# Patient Record
Sex: FEMALE | Race: WHITE | Employment: FULL TIME | URBAN - METROPOLITAN AREA
[De-identification: names, ages, dates, MRNs, and addresses within clinical notes are randomized per-mention and may not be internally consistent; named-entity substitution may affect disease eponyms.]

---

## 2017-10-01 PROBLEM — N93.9 VAGINAL BLEEDING: Status: ACTIVE | Noted: 2017-10-01

## 2017-10-02 PROBLEM — O44.00 PLACENTA PREVIA: Status: ACTIVE | Noted: 2017-10-02

## 2019-04-17 ENCOUNTER — OFFICE VISIT (OUTPATIENT)
Dept: FAMILY MEDICINE CLINIC | Age: 34
End: 2019-04-17

## 2019-04-17 VITALS
SYSTOLIC BLOOD PRESSURE: 108 MMHG | OXYGEN SATURATION: 98 % | BODY MASS INDEX: 27.64 KG/M2 | DIASTOLIC BLOOD PRESSURE: 70 MMHG | HEART RATE: 64 BPM | RESPIRATION RATE: 16 BRPM | HEIGHT: 66 IN | TEMPERATURE: 98.9 F | WEIGHT: 172 LBS

## 2019-04-17 DIAGNOSIS — Z00.00 ROUTINE PHYSICAL EXAMINATION: Primary | ICD-10-CM

## 2019-04-17 DIAGNOSIS — R00.2 PALPITATIONS: ICD-10-CM

## 2019-04-17 DIAGNOSIS — R05.9 COUGH: ICD-10-CM

## 2019-04-17 DIAGNOSIS — Z13.220 SCREENING FOR LIPID DISORDERS: ICD-10-CM

## 2019-04-17 DIAGNOSIS — Z86.32 HISTORY OF GESTATIONAL DIABETES: ICD-10-CM

## 2019-04-17 DIAGNOSIS — E55.9 VITAMIN D DEFICIENCY: ICD-10-CM

## 2019-04-17 DIAGNOSIS — I49.3 PVC'S (PREMATURE VENTRICULAR CONTRACTIONS): ICD-10-CM

## 2019-04-17 NOTE — PROGRESS NOTES
Patient: Odalys Silva MRN: [id number]  SSN: [id number]    YOB: 1985  Age: 26 y.o. Sex: female      Date of Service: 4/17/2019   Provider: MANN Mcmullen   Office Location:   [address] [place], [address] [address]  Office Phone: [phone]  Office Fax: [phone]        REASON FOR VISIT:   Chief Complaint   Patient presents with    New Patient    Physical     OB-GYN (Dr. Amilcar Browning)     Palpitations        VITALS:   Visit Vitals  /70 (BP 1 Location: Left arm, BP Patient Position: Sitting)   Pulse 64   Temp 98.9 °F (37.2 °C) (Oral)   Resp 16   Ht 5' 5.5\" (1.664 m)   Wt 172 lb (78 kg)   LMP 04/08/2019   SpO2 98%   BMI 28.19 kg/m²       MEDICATIONS:   Current Outpatient Medications   Medication Sig Dispense Refill    prenatal vit-calcium-iron-fa (PRENATAL PLUS, CALCIUM CARB,) 27 mg iron- 1 mg tab Take 1 Tab by mouth daily.  oxyCODONE-acetaminophen (PERCOCET) 5-325 mg per tablet Take 2 Tabs by mouth every six (6) hours as needed for Pain. Max Daily Amount: 8 Tabs. 30 Tab 0    ergocalciferol (VITAMIN D2) 50,000 unit capsule Take 50,000 Units by mouth every seven (7) days. Indications: VITAMIN D DEFICIENCY      ferrous sulfate 325 mg (65 mg iron) tablet Take 325 mg by mouth Daily (before breakfast). Indications: IRON DEFICIENCY ANEMIA      valACYclovir (VALTREX) 500 mg tablet Take  by mouth two (2) times a day. ALLERGIES:   No Known Allergies     MEDICAL/SURGICAL HISTORY:  Past Medical History:   Diagnosis Date    Genital herpes     Gestational diabetes     Vasovagal syncope 2016    worked up by cardiology Aby Parsons)      History reviewed. No pertinent surgical history.      FAMILY HISTORY:  Family History   Problem Relation Age of Onset    Other Mother         TIA    Gout Mother     Osteoporosis Mother     High Cholesterol Mother     Cancer Father         Skin Cancer ([place])    Gout Father     Migraines Brother     Heart Attack Maternal Grandmother     Pacemaker Maternal Grandmother     Osteoporosis Maternal Grandmother     Eczema Daughter         SOCIAL HISTORY:  Social History     Tobacco Use    Smoking status: Former Smoker     Last attempt to quit: 2/1/2016     Years since quitting: 3.2    Smokeless tobacco: Never Used   Substance Use Topics    Alcohol use: Yes     Comment: Rare            HISTORY OF PRESENT ILLNESS:   Tristin Rodrigues is a 26 y.o. female who presents to the office to establish care as a new patient, and for a routine physical exam. Patient reports she has been in good overall health, but does have a few concerns today. She currently works as an RN at [employer]. Palpitations -   Patient reports she has been experiencing heart palpitations intermittently for several years. She has also had several episodes of vasovagal syncope, which has been thoroughly worked up by cardiology in the past. She has no structural heart issues. Patient mentions that last year, she went to the ER at [facility] for worsening palpitations and associated SOB. She has a copy of a rhythm strip from her cardiac monitor at that time which shows a run of PVCs. She believes this to be consistent with the palpitations she has been feeling, which she describes as a periodic \"flutter\" in her chest and throat. Sometimes coughing will help stop the palpitations. They vary in frequency and severity, but triggers seem to include caffeine, stress, and sleep deprivation. History of Gestational DM -   Patient reports that she had gestational diabetes with her pregnancy in 2017. This did resolve after delivery and her 6 month post partum glucose tolerance test was normal, but she would like to follow up on her blood sugar today. She is actively working on weight loss through [employer]. Denies any family history of DM.      Cough -   Finally, patient mentions a lingering dry cough and post-nasal drainage following a VIKKII a few months ago. She has tried various OTC products sporadically with minimal relief. REVIEW OF SYSTEMS:  ROS (see scanned H&P form for complete 12 point ROS)     PHYSICAL EXAMINATION:  Physical Exam   Constitutional: She is oriented to person, place, and time and well-developed, well-nourished, and in no distress. HENT:   Head: Normocephalic and atraumatic. Right Ear: Tympanic membrane and external ear normal.   Left Ear: Tympanic membrane and external ear normal.   Nose: Nose normal.   Mouth/Throat: Uvula is midline, oropharynx is clear and moist and mucous membranes are normal.   Eyes: Pupils are equal, round, and reactive to light. Conjunctivae are normal. Right eye exhibits no discharge. Left eye exhibits no discharge. Neck: Neck supple. No thyromegaly present. Cardiovascular: Normal rate, regular rhythm and normal heart sounds. Exam reveals no gallop and no friction rub. No murmur heard. Pulmonary/Chest: Effort normal and breath sounds normal. No stridor. She has no wheezes. She has no rales. Abdominal: Soft. Bowel sounds are normal. She exhibits no distension and no mass. There is no tenderness. There is no rebound and no guarding. Lymphadenopathy:     She has no cervical adenopathy. Neurological: She is alert and oriented to person, place, and time. Gait normal.   Skin: Skin is warm and dry. No rash noted. Psychiatric: Mood, memory and affect normal.        RESULTS:  No results found for this visit on 04/17/19. ASSESSMENT/PLAN:  Diagnoses and all orders for this visit:    1. Routine physical examination  - Normal physical exam findings as detailed above  - Health screenings reviewed   - Age and gender specific counseling provided      2. Palpitations  3.  PVC's (premature ventricular contractions)  - Patient's symptoms most likely correspond to benign PVCs, though it would be worthwhile to have her wear a Holter monitor to clarify the frequency with which these are occurring - Will also check some basic labs including TSH to r/o thyroid disease  - Depending on Holter results, could consider low dose beta blocker for symptomatic relief, but I am a little bit hesitant as her blood pressure is on the lower end   - In the meantime, continue to work on lifestyle modification and trigger avoidance  Orders:    -     METABOLIC PANEL, COMPREHENSIVE; Future  -     TSH 3RD GENERATION; Future  -     CBC WITH AUTOMATED DIFF; Future  -     CARDIAC HOLTER MONITOR; Future    4. Vitamin D deficiency  - History of deficiency during pregnancy  - check levels   Orders:    -     VITAMIN D, 25 HYDROXY; Future    5. History of gestational diabetes  6. BMI 28.0-28.9,adult  - Check A1c  - continue weight watchers  Orders:    -     HEMOGLOBIN A1C W/O EAG; Future    7. Cough  - Suspect most likely related to seasonal allergies/post-nasal drainage  - Reassured of normal lung exam today   - Suggested otc antihistamine and/or steroid nasal spray     8. Screening for lipid disorders  - check fasting lipid panel  Orders:    -     LIPID PANEL; Future      Follow-up and Dispositions    · Return for annual physical and please have 10 hour fasting labs done in 1 week .           PATIENT CARE TEAM:   Patient Care Team:  MANN Rodriguez as PCP - General (Physician Assistant)  Jessica Sadler MD (Obstetrics & Gynecology)       Lorice Galeazzi, [address]   April 17, 2019   2:31 PM

## 2019-04-17 NOTE — PATIENT INSTRUCTIONS
A Healthy Lifestyle: Care Instructions  Your Care Instructions    A healthy lifestyle can help you feel good, stay at a healthy weight, and have plenty of energy for both work and play. A healthy lifestyle is something you can share with your whole family. A healthy lifestyle also can lower your risk for serious health problems, such as high blood pressure, heart disease, and diabetes. You can follow a few steps listed below to improve your health and the health of your family. Follow-up care is a key part of your treatment and safety. Be sure to make and go to all appointments, and call your doctor if you are having problems. It's also a good idea to know your test results and keep a list of the medicines you take. How can you care for yourself at home? · Do not eat too much sugar, fat, or fast foods. You can still have dessert and treats now and then. The goal is moderation. · Start small to improve your eating habits. Pay attention to portion sizes, drink less juice and soda pop, and eat more fruits and vegetables. ? Eat a healthy amount of food. A 3-ounce serving of meat, for example, is about the size of a deck of cards. Fill the rest of your plate with vegetables and whole grains. ? Limit the amount of soda and sports drinks you have every day. Drink more water when you are thirsty. ? Eat at least 5 servings of fruits and vegetables every day. It may seem like a lot, but it is not hard to reach this goal. A serving or helping is 1 piece of fruit, 1 cup of vegetables, or 2 cups of leafy, raw vegetables. Have an apple or some carrot sticks as an afternoon snack instead of a candy bar. Try to have fruits and/or vegetables at every meal.  · Make exercise part of your daily routine. You may want to start with simple activities, such as walking, bicycling, or slow swimming. Try to be active 30 to 60 minutes every day. You do not need to do all 30 to 60 minutes all at once.  For example, you can exercise 3 times a day for 10 or 20 minutes. Moderate exercise is safe for most people, but it is always a good idea to talk to your doctor before starting an exercise program.  · Keep moving. [place]inch [place] the lawn, work in the garden, or [employer]. Take the stairs instead of the elevator at work. · If you smoke, quit. People who smoke have an increased risk for heart attack, stroke, cancer, and other lung illnesses. Quitting is hard, but there are ways to boost your chance of quitting tobacco for good. ? Use nicotine gum, patches, or lozenges. ? Ask your doctor about stop-smoking programs and medicines. ? Keep trying. In addition to reducing your risk of diseases in the future, you will notice some benefits soon after you stop using tobacco. If you have shortness of breath or asthma symptoms, they will likely get better within a few weeks after you quit. · Limit how much alcohol you drink. Moderate amounts of alcohol (up to 2 drinks a day for men, 1 drink a day for women) are okay. But drinking too much can lead to liver problems, high blood pressure, and other health problems. Family health  If you have a family, there are many things you can do together to improve your health. · Eat meals together as a family as often as possible. · Eat healthy foods. This includes fruits, vegetables, lean meats and dairy, and whole grains. · Include your family in your fitness plan. Most people think of activities such as jogging or tennis as the way to fitness, but there are many ways you and your family can be more active. Anything that makes you breathe hard and gets your heart pumping is exercise. Here are some tips:  ? Walk to do errands or to take your child to school or the bus.  ? Go for a family bike ride after dinner instead of watching TV. Where can you learn more? Go to http://karen-ross.info/. Enter U931 in the search box to learn more about \"A Healthy Lifestyle: Care Instructions. \"  Current as of: September 11, 2018  Content Version: 11.9  © 0041-0307 [employer]. Care instructions adapted under license by [employer] (which disclaims liability or warranty for this information). If you have questions about a medical condition or this instruction, always ask your healthcare professional. [address] any warranty or liability for your use of this information.     Follow up in 1 year for annual physical and please have 10 hour fasting labs done in 1 week

## 2019-04-17 NOTE — PROGRESS NOTES
1. Have you been to the ER, urgent care clinic since your last visit? Hospitalized since your last visit? No    2. Have you seen or consulted any other health care providers outside of the [address] [place] [address] since your last visit? Include any pap smears or colon screening.  No

## 2019-04-19 ENCOUNTER — HOSPITAL ENCOUNTER (OUTPATIENT)
Dept: LAB | Age: 34
Discharge: HOME | End: 2019-04-19
Payer: OTHER GOVERNMENT

## 2019-04-19 DIAGNOSIS — Z13.220 SCREENING FOR LIPID DISORDERS: ICD-10-CM

## 2019-04-19 DIAGNOSIS — R00.2 PALPITATIONS: ICD-10-CM

## 2019-04-19 DIAGNOSIS — Z86.32 HISTORY OF GESTATIONAL DIABETES: ICD-10-CM

## 2019-04-19 DIAGNOSIS — E55.9 VITAMIN D DEFICIENCY: ICD-10-CM

## 2019-04-19 LAB
25(OH)D3 SERPL-MCNC: 26.6 NG/ML (ref 30–100)
ALBUMIN SERPL-MCNC: 3.7 G/DL (ref 3.4–5)
ALBUMIN/GLOB SERPL: 1 {RATIO} (ref 0.8–1.7)
ALP SERPL-CCNC: 66 U/L (ref 45–117)
ALT SERPL-CCNC: 14 U/L (ref 13–56)
ANION GAP SERPL CALC-SCNC: 2 MMOL/L (ref 3–18)
AST SERPL-CCNC: 7 U/L (ref 15–37)
BASOPHILS # BLD: 0 K/UL (ref 0–0.1)
BASOPHILS NFR BLD: 1 % (ref 0–2)
BILIRUB SERPL-MCNC: 0.4 MG/DL (ref 0.2–1)
BUN SERPL-MCNC: 14 MG/DL (ref 7–18)
BUN/CREAT SERPL: 22 (ref 12–20)
CALCIUM SERPL-MCNC: 9.1 MG/DL (ref 8.5–10.1)
CHLORIDE SERPL-SCNC: 109 MMOL/L (ref 100–108)
CHOLEST SERPL-MCNC: 164 MG/DL
CO2 SERPL-SCNC: 29 MMOL/L (ref 21–32)
CREAT SERPL-MCNC: 0.65 MG/DL (ref 0.6–1.3)
DIFFERENTIAL METHOD BLD: NORMAL
EOSINOPHIL # BLD: 0.2 K/UL (ref 0–0.4)
EOSINOPHIL NFR BLD: 4 % (ref 0–5)
ERYTHROCYTE [DISTWIDTH] IN BLOOD BY AUTOMATED COUNT: 13.2 % (ref 11.6–14.5)
GLOBULIN SER CALC-MCNC: 3.7 G/DL (ref 2–4)
GLUCOSE SERPL-MCNC: 91 MG/DL (ref 74–99)
HBA1C MFR BLD: 5.2 % (ref 4.2–5.6)
HCT VFR BLD AUTO: 37.4 % (ref 35–45)
HDLC SERPL-MCNC: 49 MG/DL (ref 40–60)
HDLC SERPL: 3.3 {RATIO} (ref 0–5)
HGB BLD-MCNC: 12.2 G/DL (ref 12–16)
LDLC SERPL CALC-MCNC: 101.2 MG/DL (ref 0–100)
LIPID PROFILE,FLP: ABNORMAL
LYMPHOCYTES # BLD: 2.1 K/UL (ref 0.9–3.6)
LYMPHOCYTES NFR BLD: 37 % (ref 21–52)
MCH RBC QN AUTO: 28.2 PG (ref 24–34)
MCHC RBC AUTO-ENTMCNC: 32.6 G/DL (ref 31–37)
MCV RBC AUTO: 86.4 FL (ref 74–97)
MONOCYTES # BLD: 0.4 K/UL (ref 0.05–1.2)
MONOCYTES NFR BLD: 7 % (ref 3–10)
NEUTS SEG # BLD: 3 K/UL (ref 1.8–8)
NEUTS SEG NFR BLD: 51 % (ref 40–73)
PLATELET # BLD AUTO: 357 K/UL (ref 135–420)
PMV BLD AUTO: 9.8 FL (ref 9.2–11.8)
POTASSIUM SERPL-SCNC: 4.8 MMOL/L (ref 3.5–5.5)
PROT SERPL-MCNC: 7.4 G/DL (ref 6.4–8.2)
RBC # BLD AUTO: 4.33 M/UL (ref 4.2–5.3)
SODIUM SERPL-SCNC: 140 MMOL/L (ref 136–145)
TRIGL SERPL-MCNC: 69 MG/DL (ref ?–150)
TSH SERPL DL<=0.05 MIU/L-ACNC: 0.57 UIU/ML (ref 0.36–3.74)
VLDLC SERPL CALC-MCNC: 13.8 MG/DL
WBC # BLD AUTO: 5.8 K/UL (ref 4.6–13.2)

## 2019-04-19 PROCEDURE — 80053 COMPREHEN METABOLIC PANEL: CPT

## 2019-04-19 PROCEDURE — 36415 COLL VENOUS BLD VENIPUNCTURE: CPT

## 2019-04-19 PROCEDURE — 85025 COMPLETE CBC W/AUTO DIFF WBC: CPT

## 2019-04-19 PROCEDURE — 83036 HEMOGLOBIN GLYCOSYLATED A1C: CPT

## 2019-04-19 PROCEDURE — 84443 ASSAY THYROID STIM HORMONE: CPT

## 2019-04-19 PROCEDURE — 80061 LIPID PANEL: CPT

## 2019-04-19 PROCEDURE — 82306 VITAMIN D 25 HYDROXY: CPT

## 2019-04-25 ENCOUNTER — HOSPITAL ENCOUNTER (OUTPATIENT)
Dept: NON INVASIVE DIAGNOSTICS | Age: 34
Discharge: HOME | End: 2019-04-25
Attending: PHYSICIAN ASSISTANT
Payer: OTHER GOVERNMENT

## 2019-04-25 DIAGNOSIS — R00.2 PALPITATIONS: ICD-10-CM

## 2019-04-25 DIAGNOSIS — I49.3 PVC'S (PREMATURE VENTRICULAR CONTRACTIONS): ICD-10-CM

## 2019-04-25 PROCEDURE — 93226 XTRNL ECG REC<48 HR SCAN A/R: CPT

## 2020-03-27 ENCOUNTER — NURSE TRIAGE (OUTPATIENT)
Dept: OTHER | Facility: CLINIC | Age: 35
End: 2020-03-27

## 2020-03-27 NOTE — TELEPHONE ENCOUNTER
Reason for Disposition   [1] Cough occurs AND [2] within 14 days of COVID-19 EXPOSURE    Answer Assessment - Initial Assessment Questions  1. CONFIRMED CASE: \"Who is the person with the confirmed COVID-19 infection that you were exposed to?\"      [address] Nurse Practitioner  2. PLACE of CONTACT: \"Where were you when you were exposed to COVID-19  (coronavirus disease 2019)? \" (e.g., city, state, country)      Pauline Jordan at [facility]  3. TYPE of CONTACT: \"How much contact was there? 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 DATE of CONTACT: \"When did you have contact with a coronavirus patient? \" (e.g., days)      3/16/20  5. DURATION of CONTACT: \"How long were you in contact with the COVID-19 (coronavirus disease) patient? \" (e.g., a few seconds, passed by person, a few minutes, live with the patient)      12 hours   6. SYMPTOMS: \"Do you have any symptoms? \" (e.g., fever, cough, breathing difficulty)      Slight cough, chest tightness, diarrhea, mild sore throat on and off  7. PREGNANCY OR POSTPARTUM: \"Is there any chance you are pregnant? \" \"When was your last menstrual period? \" \"Did you deliver in the last 2 weeks? \"      LMP February 10th  8. HIGH RISK: \"Do you have any heart or lung problems? Do you have a weakened immune system? \" (e.g., CHF, COPD, asthma, HIV positive, chemotherapy, renal failure, diabetes mellitus, sickle cell anemia)      No    Protocols used: CORONAVIRUS (COVID-19) EXPOSURE-ADULT-    Advised patient to call her PCP, Occupational health and local health department to inform of exposure.

## 2020-03-30 ENCOUNTER — TELEPHONE (OUTPATIENT)
Dept: FAMILY MEDICINE CLINIC | Age: 35
End: 2020-03-30

## 2020-03-30 NOTE — TELEPHONE ENCOUNTER
Please triage for symptoms.  If asymptomatic, we are still recommending 14 day home quarantine (starting date of exposure) and monitoring for development of fever, cough, SOB, etc.

## 2020-03-30 NOTE — TELEPHONE ENCOUNTER
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 They've had several PUIs recently, and patient had exposure to a colleague NP who later developed symptoms and went on to test positive for COVID. Patient developed symptoms of slight cough and chest tightness, but is unsure if this might have been due to allergies. She already called her employer and was given instructions on home quarantine and guidelines for when she is safe to return to work. She is not scheduled to work for another two weeks. Her greatest concern is that her 76 y.o. mother is living with her right now, caring for her 2 y.o. child. Patient has been isolating herself in her room but she is wondering when she can stop and what else she can do to protect her family. We reviewed the current CDC guidelines, and I advised her that she can resume normal activity in her house once it has been at least 7 days since her symptoms started, she has been 72 hours fever free, and her symptoms are improving. She presently meets all of these criteria AND it has been 14 days since her initial exposure. We reviewed basic precautions to prevent spread of disease. She was encouraged to call back with any additional questions.

## 2020-05-29 ENCOUNTER — TELEPHONE (OUTPATIENT)
Dept: FAMILY MEDICINE CLINIC | Age: 35
End: 2020-05-29

## 2020-05-29 ENCOUNTER — VIRTUAL VISIT (OUTPATIENT)
Dept: FAMILY MEDICINE CLINIC | Age: 35
End: 2020-05-29

## 2020-05-29 DIAGNOSIS — L02.92 BOIL: Primary | ICD-10-CM

## 2020-05-29 RX ORDER — MELATONIN
DAILY
COMMUNITY

## 2020-05-29 NOTE — PROGRESS NOTES
Kobe Venegas is a 37 y.o. female who was seen by synchronous (real-time) audio-video technology on 5/29/2020. Consent: Kobe Venegas, who was seen by synchronous (real-time) audio-video technology, and/or her healthcare decision maker, is aware that this patient-initiated, Telehealth encounter on 5/29/2020 is a billable service, with coverage as determined by her insurance carrier. She is aware that she may receive a bill and has provided verbal consent to proceed: Yes. Assessment & Plan:   Diagnoses and all orders for this visit:    Boil: inside rt nostril. Giving topical mupirocin. Other orders  -     mupirocin calcium (BACTROBAN) 2 % nasal ointment; by Both Nostrils route two (2) times a day., Normal, Disp-1 g, R-0    Pt understood and agree with the plan   F/u in one week or sooner if needed . Subjective:   Kobe Venegas is a 37 y.o. female who was seen for Skin Problem  c/o lump over rt nostril. Occurred since one week. Had some pus drainage. Mild pain and discomfort over surrounding area. No fever. Taken otc neosporin and it did not help. No cold or nasal discharge. Prior to Admission medications    Medication Sig Start Date End Date Taking? Authorizing Provider   cholecalciferol (Vitamin D3) (1000 Units /25 mcg) tablet Take  by mouth daily. Yes Provider, Historical   mupirocin calcium (BACTROBAN) 2 % nasal ointment by Both Nostrils route two (2) times a day. 5/29/20  Yes Eusebio Dietz MD   prenatal vit-calcium-iron-fa (PRENATAL PLUS, CALCIUM CARB,) 27 mg iron- 1 mg tab Take 1 Tab by mouth daily. Yes Provider, Historical   valACYclovir (VALTREX) 500 mg tablet Take  by mouth two (2) times a day.     Provider, Historical     No Known Allergies      ROS : see HPI     Objective:     Visit Vitals  LMP 05/10/2020      General: alert, cooperative, no distress   Mental  status: normal mood, behavior, speech, dress, motor activity, and thought processes, able to follow commands Neck: no visualized mass   Resp: no respiratory distress   Neuro: no gross deficits   Skin: no discoloration or lesions of concern on visible areas   Psychiatric: normal affect, consistent with stated mood, no evidence of hallucinations     Additional exam findings: nose: small lump inside rt nostril. Mild tender on touch on self palpation by pt. No discharge during video visit. We discussed the expected course, resolution and complications of the diagnosis(es) in detail. Medication risks, benefits, costs, interactions, and alternatives were discussed as indicated. I advised her to contact the office if her condition worsens, changes or fails to improve as anticipated. She expressed understanding with the diagnosis(es) and plan. Kerrie Mason is a 37 y.o. female who was evaluated by a video visit encounter for concerns as above. Patient identification was verified prior to start of the visit. A caregiver was present when appropriate. Due to this being a TeleHealth encounter (During PKE-07 public health emergency), evaluation of the following organ systems was limited: Vitals/Constitutional/EENT/Resp/CV/GI//MS/Neuro/Skin/Heme-Lymph-Imm. Pursuant to the emergency declaration under the [facility], 1135 waiver authority and the [employer] Act, this Virtual  Visit was conducted, with patient's (and/or legal guardian's) consent, to reduce the patient's risk of exposure to COVID-19 and provide necessary medical care. Services were provided through a video synchronous discussion virtually to substitute for in-person clinic visit. Patient and provider were located at their individual homes.       Paddy Duane, MD

## 2020-05-29 NOTE — TELEPHONE ENCOUNTER
The RX mupirocin calcium (BACTROBAN) 2 % nasal ointment     Is out of stock at the [facility] that it was sent to and the pt called around to 5 other pharmacies and it's not there. Pt would like to know if there is something else that can be called in. Please advise.

## 2021-03-26 ENCOUNTER — OFFICE VISIT (OUTPATIENT)
Dept: FAMILY MEDICINE CLINIC | Age: 36
End: 2021-03-26
Payer: OTHER GOVERNMENT

## 2021-03-26 VITALS
TEMPERATURE: 98.1 F | DIASTOLIC BLOOD PRESSURE: 74 MMHG | SYSTOLIC BLOOD PRESSURE: 98 MMHG | BODY MASS INDEX: 29.89 KG/M2 | HEART RATE: 61 BPM | OXYGEN SATURATION: 98 % | HEIGHT: 66 IN | WEIGHT: 186 LBS | RESPIRATION RATE: 16 BRPM

## 2021-03-26 DIAGNOSIS — Z00.00 WELL WOMAN EXAM (NO GYNECOLOGICAL EXAM): Primary | ICD-10-CM

## 2021-03-26 DIAGNOSIS — E55.9 VITAMIN D DEFICIENCY: ICD-10-CM

## 2021-03-26 DIAGNOSIS — Z13.220 SCREENING FOR HYPERLIPIDEMIA: ICD-10-CM

## 2021-03-26 DIAGNOSIS — E04.9 ENLARGED THYROID GLAND: ICD-10-CM

## 2021-03-26 DIAGNOSIS — F32.89 OTHER DEPRESSION: ICD-10-CM

## 2021-03-26 DIAGNOSIS — Z87.898 H/O SYNCOPE: ICD-10-CM

## 2021-03-26 DIAGNOSIS — Z13.1 SCREENING FOR DIABETES MELLITUS: ICD-10-CM

## 2021-03-26 DIAGNOSIS — Z13.0 SCREENING FOR DEFICIENCY ANEMIA: ICD-10-CM

## 2021-03-26 PROCEDURE — 99213 OFFICE O/P EST LOW 20 MIN: CPT | Performed by: FAMILY MEDICINE

## 2021-03-26 PROCEDURE — 99395 PREV VISIT EST AGE 18-39: CPT | Performed by: FAMILY MEDICINE

## 2021-03-26 RX ORDER — SERTRALINE HYDROCHLORIDE 25 MG/1
25 TABLET, FILM COATED ORAL DAILY
Qty: 30 TAB | Refills: 1 | Status: SHIPPED | OUTPATIENT
Start: 2021-03-26 | End: 2021-04-26

## 2021-03-26 RX ORDER — SERTRALINE HYDROCHLORIDE 50 MG/1
TABLET, FILM COATED ORAL
COMMUNITY
Start: 2021-01-30 | End: 2021-03-26 | Stop reason: SDUPTHER

## 2021-03-26 NOTE — PATIENT INSTRUCTIONS
Well Visit, Ages 25 to 48: Care Instructions Your Care Instructions Physical exams can help you stay healthy. Your doctor has checked your overall health and may have suggested ways to take good care of yourself. He or she also may have recommended tests. At home, you can help prevent illness with healthy eating, regular exercise, and other steps. Follow-up care is a key part of your treatment and safety. Be sure to make and go to all appointments, and call your doctor if you are having problems. It's also a good idea to know your test results and keep a list of the medicines you take. How can you care for yourself at home? · Reach and stay at a healthy weight. This will lower your risk for many problems, such as obesity, diabetes, heart disease, and high blood pressure. · Get at least 30 minutes of physical activity on most days of the week. Walking is a good choice. You also may want to do other activities, such as running, swimming, cycling, or playing tennis or team sports. Discuss any changes in your exercise program with your doctor. · Do not smoke or allow others to smoke around you. If you need help quitting, talk to your doctor about stop-smoking programs and medicines. These can increase your chances of quitting for good. · Talk to your doctor about whether you have any risk factors for sexually transmitted infections (STIs). Having one sex partner (who does not have STIs and does not have sex with anyone else) is a good way to avoid these infections. · Use birth control if you do not want to have children at this time. Talk with your doctor about the choices available and what might be best for you. · Protect your skin from too much sun. When you're outdoors from 10 a.m. to 4 p.m., stay in the shade or cover up with clothing and a hat with a wide brim. Wear sunglasses that block UV rays. Even when it's cloudy, put broad-spectrum sunscreen (SPF 30 or higher) on any exposed skin.  
· See a dentist one or two times a year for checkups and to have your teeth cleaned. · Wear a seat belt in the car. Follow your doctor's advice about when to have certain tests. These tests can spot problems early. For everyone · Cholesterol. Have the fat (cholesterol) in your blood tested after age 21. Your doctor will tell you how often to have this done based on your age, family history, or other things that can increase your risk for heart disease. · Blood pressure. Have your blood pressure checked during a routine doctor visit. Your doctor will tell you how often to check your blood pressure based on your age, your blood pressure results, and other factors. · Vision. Talk with your doctor about how often to have a glaucoma test. 
· Diabetes. Ask your doctor whether you should have tests for diabetes. · Colon cancer. Your risk for colorectal cancer gets higher as you get older. Some experts say that adults should start regular screening at age 48 and stop at age 76. Others say to start before age 48 or continue after age 76. Talk with your doctor about your risk and when to start and stop screening. For women · Breast exam and mammogram. Talk to your doctor about when you should have a clinical breast exam and a mammogram. Medical experts differ on whether and how often women under 50 should have these tests. Your doctor can help you decide what is right for you. · Cervical cancer screening test and pelvic exam. Begin with a Pap test at age 24. The test often is part of a pelvic exam. Starting at age 27, you may choose to have a Pap test, an HPV test, or both tests at the same time (called co-testing). Talk with your doctor about how often to have testing. · Tests for sexually transmitted infections (STIs). Ask whether you should have tests for STIs. You may be at risk if you have sex with more than one person, especially if your partners do not wear condoms. For men · Tests for sexually transmitted infections (STIs). Ask whether you should have tests for STIs. You may be at risk if you have sex with more than one person, especially if you do not wear a condom. · Testicular cancer exam. Ask your doctor whether you should check your testicles regularly. · Prostate exam. Talk to your doctor about whether you should have a blood test (called a PSA test) for prostate cancer. Experts differ on whether and when men should have this test. Some experts suggest it if you are older than 39 and are -American or have a father or brother who got prostate cancer when he was younger than 72. When should you call for help? Watch closely for changes in your health, and be sure to contact your doctor if you have any problems or symptoms that concern you. Where can you learn more? Go to http://www.lucas.com/ Enter P072 in the search box to learn more about \"Well Visit, Ages 25 to 48: Care Instructions. \" Current as of: May 27, 2020               Content Version: 12.6 © 9805-6174 [employer]. Care instructions adapted under license by [employer] (which disclaims liability or warranty for this information). If you have questions about a medical condition or this instruction, always ask your healthcare professional. [place] [address] any warranty or liability for your use of this information. Breast Self-Exam: Care Instructions Your Care Instructions A breast self-exam is when you check your breasts for lumps or changes. This regular exam helps you learn how your breasts normally look and feel. Most breast problems or changes are not because of cancer. Breast self-exam is not a substitute for a mammogram. Having regular breast exams by your doctor and regular mammograms improve your chances of finding any problems with your breasts.  
Some women set a time each month to do a step-by-step breast self-exam. Other women like a less formal system. They might look at their breasts as they brush their teeth, or feel their breasts once in a while in the shower. If you notice a change in your breast, tell your doctor. Follow-up care is a key part of your treatment and safety. Be sure to make and go to all appointments, and call your doctor if you are having problems. It's also a good idea to know your test results and keep a list of the medicines you take. How do you do a breast self-exam? 
· The best time to examine your breasts is usually one week after your menstrual period begins. Your breasts should not be tender then. If you do not have periods, you might do your exam on a day of the month that is easy to remember. · To examine your breasts: ? Remove all your clothes above the waist and lie down. When you are lying down, your breast tissue spreads evenly over your chest wall, which makes it easier to feel all your breast tissue. ? Use the padsnot the fingertipsof the 3 middle fingers of your left hand to check your right breast. Move your fingers slowly in small coin-sized circles that overlap. ? Use three levels of pressure to feel of all your breast tissue. Use light pressure to feel the tissue close to the skin surface. Use medium pressure to feel a little deeper. Use firm pressure to feel your tissue close to your breastbone and ribs. Use each pressure level to feel your breast tissue before moving on to the next spot. ? Check your entire breast, moving up and down as if following a strip from the collarbone to the bra line, and from the armpit to the ribs. Repeat until you have covered the entire breast. 
? Repeat this procedure for your left breast, using the pads of the 3 middle fingers of your right hand. · To examine your breasts while in the shower: 
? Place one arm over your head and lightly soap your breast on that side. ?  Using the pads of your fingers, gently move your hand over your breast (in the strip pattern described above), feeling carefully for any lumps or changes. 
? Repeat for the other breast. 
· Have your doctor inspect anything you notice to see if you need further testing. 
Where can you learn more? 
Go to https://www.Wappwolf.net/DealCloudonnections 
Enter P148 in the search box to learn more about \"Breast Self-Exam: Care Instructions.\" 
Current as of: April 29, 2020               Content Version: 12.6 
© 2006-2020 [employer].  
Care instructions adapted under license by [employer] (which disclaims liability or warranty for this information). If you have questions about a medical condition or this instruction, always ask your healthcare professional. [employer] disclaims any warranty or liability for your use of this information. 
 
 
  
Depression and Chronic Disease: Care Instructions 
Your Care Instructions 
  
A chronic disease is one that you have for a long time. Some chronic diseases can be controlled, but they usually cannot be cured. Depression is common in people with chronic diseases, but it often goes unnoticed. 
Many people have concerns about seeking treatment for a mental health problem. You may think it's a sign of weakness, or you don't want people to know about it. It's important to overcome these reasons for not seeking treatment. Treating depression or anxiety is good for your health. 
Follow-up care is a key part of your treatment and safety. Be sure to make and go to all appointments, and call your doctor if you are having problems. It's also a good idea to know your test results and keep a list of the medicines you take. 
How can you care for yourself at home? 
Watch for symptoms of depression 
The symptoms of depression are often subtle at first. You may think they are caused by your disease rather than depression. Or you may think it is normal to be depressed when you have a chronic disease. 
If you are depressed you may: 
· Feel sad or hopeless. 
· Feel  guilty or worthless. · Not enjoy the things you used to enjoy. · Feel hopeless, as though life is not worth living. · Have trouble thinking or remembering. · Have low energy, and you may not eat or sleep well. · Pull away from others. · Think often about death or killing yourself. (Keep the numbers for these national suicide hotlines: 9-347-321-TALK [1-318.311.4795] and 1-845-ZYYTZZI [1-584.971.4289]. ) Get treatment By treating your depression, you can feel more hopeful and have more energy. If you feel better, you may take better care of yourself, so your health may improve. · Talk to your doctor if you have any changes in mood during treatment for your disease. · Ask your doctor for help. Counseling, antidepressant medicine, or a combination of the two can help most people with depression. Often a combination works best. Counseling can also help you cope with having a chronic disease. When should you call for help? Call 911 anytime you think you may need emergency care. For example, call if: 
  · You feel like hurting yourself or someone else.  
  · Someone you know has depression and is about to attempt or is attempting suicide. Call your doctor now or seek immediate medical care if: 
  · You hear voices.  
  · Someone you know has depression and: 
? Starts to give away his or her possessions. ? Uses illegal drugs or drinks alcohol heavily. ? Talks or writes about death, including writing suicide notes or talking about guns, knives, or pills. ? Starts to spend a lot of time alone. ? Acts very aggressively or suddenly appears calm. Watch closely for changes in your health, and be sure to contact your doctor if: 
  · You do not get better as expected. Where can you learn more? Go to http://www.gray.com/ Enter K287 in the search box to learn more about \"Depression and Chronic Disease: Care Instructions. \" Current as of: January 31, 2020               Content Version: 12.6 © 6445-1590 [employer]. Care instructions adapted under license by [employer] (which disclaims liability or warranty for this information). If you have questions about a medical condition or this instruction, always ask your healthcare professional. [address] any warranty or liability for your use of this information. Goiter: Care Instructions Your Care Instructions A goiter is an enlarged thyroid gland. It can cause swelling in your neck. Your thyroid is found in the front of your neck. It makes a hormone that controls how your body uses energy. Goiters are caused by different things. Some are caused by high or low levels of thyroid hormone. Others are caused by too little iodine in the diet, or a growth or disease in the thyroid. In the [place], most goiters are caused by long-term autoimmune thyroiditis. This is also called Hashimoto's thyroiditis. It happens when the body's immune system damages the thyroid. You may take thyroid hormone to reduce the size of your goiter. Or you may need surgery or radioactive iodine treatment. Some people don't need any treatment. They only need to watch for changes in the goiter. Follow-up care is a key part of your treatment and safety. Be sure to make and go to all appointments, and call your doctor if you are having problems. It's also a good idea to know your test results and keep a list of the medicines you take. How can you care for yourself at home? · Be safe with medicines. Take your medicines exactly as prescribed. Call your doctor if you think you are having a problem with your medicine. You will get more details on the specific medicines your doctor prescribes. When should you call for help? Call 911 anytime you think you may need emergency care. For example, call if: 
  · You have trouble breathing.   
Watch closely for changes in your health, and be sure to contact your doctor if: 
  · Your eyes turn red and bulge.  
  · You have trouble swallowing.  
  · You feel very tired or weak.  
  · You lose weight but are eating the same or more than usual.  
Where can you learn more? Go to http://www.gray.com/ Enter G388 in the search box to learn more about \"Goiter: Care Instructions. \" Current as of: March 31, 2020               Content Version: 12.6 © 2006-2020 [employer]. Care instructions adapted under license by [employer] (which disclaims liability or warranty for this information). If you have questions about a medical condition or this instruction, always ask your healthcare professional. [place] [address] any warranty or liability for your use of this information.

## 2021-03-26 NOTE — PROGRESS NOTES
Chief Complaint   Patient presents with    Well Woman     No pap- last pap with Dr. Kendrick Barksdale 7/27/2020    Depression     started on Zoloft- doesn't feel like it's working   [employer] Ivy/Poison [place] Exposure     saw dermatology yesterday     1. Have you been to the ER, urgent care clinic since your last visit? Hospitalized since your last visit? No    2. Have you seen or consulted any other health care providers outside of the [address] [place] [address] since your last visit? Include any pap smears or colon screening.  OB/GYN Dr. Kendrick Barksdale, [address] [facility] [place]

## 2021-03-26 NOTE — PROGRESS NOTES
Subjective:   38 y.o. female for Well Woman Check. Anastacia patient transferred to me    Her gyne and breast care is done elsewhere by her Ob-Gyne physician. Last Pap with Dr. Barry Serra in 2020. No prior history of abnormal Pap smear. No pelvic pain or vaginal discharge. Sexually active. Now planning to try for second baby in couple of months. History of depression. Rachel Lama was more postpartum. Now feeling much better. Wanted to taper on Zoloft. Taking it since years. I have decreased the dose and advised her to stay on few weeks and then try to go down further. We will follow-up in a month to discuss and a further decrease in dose. I have also advised her to talk to Dr. Barry Serra who is her OB/GYN regarding tapering the dose as she has a prior history of postpartum depression. She understood it well. During visit she is sitting comfortable and did not appear in any acute distress  Mention to me that as a young age as she used to passing out a lot. Also history of near syncope and syncope. She had a cardiac work-up done by Dr. Robb Bowling at [place] and she was recommended no further follow-up. She had a echo and a Holter monitor done as she has history of PVC. Feels on and off for racing heart. No associated chest pain or shortness of breath. I have advised nurse to obtain the records and advised patient to make a follow-up appointment with him 1 more time and see. Last episode of near syncope was in 2016. No recurrent episodes since then. I have seen her blood pressure on the soft side probably possibility of orthostatic hypotension. Also discussed to drink more water and salty snack as needed if she feels like uneasiness or near passing episode. Patient Active Problem List    Diagnosis Date Noted    Placenta previa 10/02/2017    Vaginal bleeding 10/01/2017     Current Outpatient Medications   Medication Sig Dispense Refill    sertraline (ZOLOFT) 25 mg tablet Take 1 Tab by mouth daily.  30 Tab 1    cholecalciferol (Vitamin D3) (1000 Units /25 mcg) tablet Take  by mouth daily.  valACYclovir (VALTREX) 500 mg tablet Take  by mouth two (2) times a day.  prenatal vit-calcium-iron-fa (PRENATAL PLUS, CALCIUM CARB,) 27 mg iron- 1 mg tab Take 1 Tab by mouth daily.  mupirocin calcium (BACTROBAN) 2 % nasal ointment by Both Nostrils route two (2) times a day. 1 g 0     No Known Allergies  Past Medical History:   Diagnosis Date    Genital herpes     Gestational diabetes     Vasovagal syncope     worked up by cardiology Marco Machado)     No past surgical history on file. Family History   Problem Relation Age of Onset    Other Mother         TIA    Gout Mother     Osteoporosis Mother     High Cholesterol Mother     Cancer Father         Skin Cancer ([place])    Gout Father     Migraines Brother     Heart Attack Maternal Grandmother     Pacemaker Maternal Grandmother     Osteoporosis Maternal Grandmother     Eczema Daughter      Social History     Tobacco Use    Smoking status: Former Smoker     Quit date: 2016     Years since quittin.1    Smokeless tobacco: Never Used   Substance Use Topics    Alcohol use: Yes     Comment: Rare        Lab Results   Component Value Date/Time    WBC 5.8 2019 11:09 AM    HGB 12.2 2019 11:09 AM    HCT 37.4 2019 11:09 AM    PLATELET 423  11:09 AM    MCV 86.4 2019 11:09 AM     Lab Results   Component Value Date/Time    Sodium 140 2019 11:09 AM    Potassium 4.8 2019 11:09 AM    Chloride 109 (H) 2019 11:09 AM    CO2 29 2019 11:09 AM    Anion gap 2 (L) 2019 11:09 AM    Glucose 91 2019 11:09 AM    BUN 14 2019 11:09 AM    Creatinine 0.65 2019 11:09 AM    BUN/Creatinine ratio 22 (H) 2019 11:09 AM    GFR est AA >60 2019 11:09 AM    GFR est non-AA >60 2019 11:09 AM    Calcium 9.1 2019 11:09 AM    Bilirubin, total 0.4 2019 11:09 AM    Alk. phosphatase 66 04/19/2019 11:09 AM    Protein, total 7.4 04/19/2019 11:09 AM    Albumin 3.7 04/19/2019 11:09 AM    Globulin 3.7 04/19/2019 11:09 AM    A-G Ratio 1.0 04/19/2019 11:09 AM    ALT (SGPT) 14 04/19/2019 11:09 AM    AST (SGOT) 7 (L) 04/19/2019 11:09 AM     Lab Results   Component Value Date/Time    Cholesterol, total 164 04/19/2019 11:09 AM    HDL Cholesterol 49 04/19/2019 11:09 AM    LDL, calculated 101.2 (H) 04/19/2019 11:09 AM    VLDL, calculated 13.8 04/19/2019 11:09 AM    Triglyceride 69 04/19/2019 11:09 AM    CHOL/HDL Ratio 3.3 04/19/2019 11:09 AM   '  Lab Results   Component Value Date/Time    TSH 0.57 04/19/2019 11:09 AM     Lab Results   Component Value Date/Time    Hemoglobin A1c 5.2 04/19/2019 11:09 AM     Lab Results   Component Value Date/Time    Vitamin D 25-Hydroxy 26.6 (L) 04/19/2019 11:09 AM           ROS: Feeling generally well. No TIA's or unusual headaches, no dysphagia. No prolonged cough. No dyspnea or chest pain on exertion. No abdominal pain, change in bowel habits, black or bloody stools. No urinary tract symptoms. No new or unusual musculoskeletal symptoms. Specific concerns today: See HPI    Objective: The patient appears well, alert, oriented x 3, in no distress. Visit Vitals  BP 98/74 (BP 1 Location: Left arm, BP Patient Position: Sitting, BP Cuff Size: Adult)   Pulse 61   Temp 98.1 °F (36.7 °C) (Oral)   Resp 16   Ht 5' 6\" (1.676 m)   Wt 186 lb (84.4 kg)   LMP 02/28/2021 (Exact Date)   SpO2 98%   Breastfeeding No   BMI 30.02 kg/m²     ENT normal.  Neck supple. No adenopathy. Questionable palpable enlarged thyroid gland. LASHONDA. Lungs are clear, good air entry, no wheezes, rhonchi or rales. S1 and S2 normal, no murmurs, regular rate and rhythm. Abdomen soft without tenderness, guarding, mass or organomegaly. Extremities show no edema, normal peripheral pulses. Neurological is normal, no focal findings. Breast and Pelvic exams are deferred.     Assessment/Plan: ICD-10-CM ICD-9-CM    1. Well woman exam (no gynecological exam)  Z00.00 V70.0     [V70.0]   2. Screening for deficiency anemia  Z13.0 V78.1 CBC W/O DIFF   3. Screening for diabetes mellitus  Z13.1 V77.1 HEMOGLOBIN A1C WITH EAG   4. Screening for hyperlipidemia  Z13.220 V77.91 LIPID PANEL   5. Other depression: Now we will taper the Zoloft dose. See HPI. Follow-up in a month. If she does well on this dose we will decrease the dose further. Also she is trying family-planning for the second baby and I have advised her to discuss it with Dr. Pato Maurer as well C61.20 945 METABOLIC PANEL, COMPREHENSIVE      TSH 3RD GENERATION      sertraline (ZOLOFT) 25 mg tablet   6. Vitamin D deficiency: Prior history of vitamin D deficiency. We will recheck the labs E55.9 268.9 VITAMIN D, 25 HYDROXY   7. H/O syncope: Back in 2016. No recurrent episodes since then. Questionable orthostatic hypotension as her blood pressure on the softer side. She had echo and Holter monitor at that time. I have discussed if recurrent episode we will consider doing tilt table test and meantime I have advised her to have a follow-up appointment with her cardiologist Dr. Landry Offer 1 more time as that she is trying to get pregnant for second baby Z87.898 V15.89    8. Enlarged thyroid gland: Palpable enlarged thyroid gland. Will obtain ultrasound in order the thyroid function. She is asymptomatic E04.9 240.9 US THYROID/PARATHYROID/SOFT TISS   Patient understood and agreed with the plan  Review health maintenance  We will obtain Pap smear records from Dr. Pato Maurer which was Elizabeth modi in 2020  Follow-up and Dispositions    · Return in about 1 month (around 4/26/2021). Please note that this dictation was completed with Huaat, the Nival voice recognition software. Quite often unanticipated grammatical, syntax, homophones, and other interpretive errors are inadvertently transcribed by the computer software. Please disregard these errors.   Please excuse any errors that have escaped final proofreading.

## 2021-03-27 ENCOUNTER — HOSPITAL ENCOUNTER (OUTPATIENT)
Dept: LAB | Age: 36
Discharge: HOME | End: 2021-03-27
Payer: OTHER GOVERNMENT

## 2021-03-27 DIAGNOSIS — Z13.220 SCREENING FOR HYPERLIPIDEMIA: ICD-10-CM

## 2021-03-27 DIAGNOSIS — F32.89 OTHER DEPRESSION: ICD-10-CM

## 2021-03-27 DIAGNOSIS — E55.9 VITAMIN D DEFICIENCY: ICD-10-CM

## 2021-03-27 DIAGNOSIS — Z13.0 SCREENING FOR DEFICIENCY ANEMIA: ICD-10-CM

## 2021-03-27 DIAGNOSIS — Z13.1 SCREENING FOR DIABETES MELLITUS: ICD-10-CM

## 2021-03-27 LAB
25(OH)D3 SERPL-MCNC: 32.2 NG/ML (ref 30–100)
ALBUMIN SERPL-MCNC: 3.5 G/DL (ref 3.4–5)
ALBUMIN/GLOB SERPL: 1 {RATIO} (ref 0.8–1.7)
ALP SERPL-CCNC: 57 U/L (ref 45–117)
ALT SERPL-CCNC: 13 U/L (ref 13–56)
ANION GAP SERPL CALC-SCNC: 2 MMOL/L (ref 3–18)
AST SERPL-CCNC: 10 U/L (ref 10–38)
BILIRUB SERPL-MCNC: 0.2 MG/DL (ref 0.2–1)
BUN SERPL-MCNC: 13 MG/DL (ref 7–18)
BUN/CREAT SERPL: 25 (ref 12–20)
CALCIUM SERPL-MCNC: 8.6 MG/DL (ref 8.5–10.1)
CHLORIDE SERPL-SCNC: 109 MMOL/L (ref 100–111)
CHOLEST SERPL-MCNC: 169 MG/DL
CO2 SERPL-SCNC: 28 MMOL/L (ref 21–32)
CREAT SERPL-MCNC: 0.53 MG/DL (ref 0.6–1.3)
ERYTHROCYTE [DISTWIDTH] IN BLOOD BY AUTOMATED COUNT: 13.2 % (ref 11.6–14.5)
EST. AVERAGE GLUCOSE BLD GHB EST-MCNC: 105 MG/DL
GLOBULIN SER CALC-MCNC: 3.5 G/DL (ref 2–4)
GLUCOSE SERPL-MCNC: 83 MG/DL (ref 74–99)
HBA1C MFR BLD: 5.3 % (ref 4.2–5.6)
HCT VFR BLD AUTO: 36.4 % (ref 35–45)
HDLC SERPL-MCNC: 50 MG/DL (ref 40–60)
HDLC SERPL: 3.4 {RATIO} (ref 0–5)
HGB BLD-MCNC: 11.6 G/DL (ref 12–16)
LDLC SERPL CALC-MCNC: 103.2 MG/DL (ref 0–100)
LIPID PROFILE,FLP: ABNORMAL
MCH RBC QN AUTO: 28 PG (ref 24–34)
MCHC RBC AUTO-ENTMCNC: 31.9 G/DL (ref 31–37)
MCV RBC AUTO: 87.7 FL (ref 74–97)
PLATELET # BLD AUTO: 361 K/UL (ref 135–420)
PMV BLD AUTO: 9.3 FL (ref 9.2–11.8)
POTASSIUM SERPL-SCNC: 4.6 MMOL/L (ref 3.5–5.5)
PROT SERPL-MCNC: 7 G/DL (ref 6.4–8.2)
RBC # BLD AUTO: 4.15 M/UL (ref 4.2–5.3)
SODIUM SERPL-SCNC: 139 MMOL/L (ref 136–145)
TRIGL SERPL-MCNC: 79 MG/DL (ref ?–150)
TSH SERPL DL<=0.05 MIU/L-ACNC: 1.81 UIU/ML (ref 0.36–3.74)
VLDLC SERPL CALC-MCNC: 15.8 MG/DL
WBC # BLD AUTO: 7.3 K/UL (ref 4.6–13.2)

## 2021-03-27 PROCEDURE — 80061 LIPID PANEL: CPT

## 2021-03-27 PROCEDURE — 85027 COMPLETE CBC AUTOMATED: CPT

## 2021-03-27 PROCEDURE — 84443 ASSAY THYROID STIM HORMONE: CPT

## 2021-03-27 PROCEDURE — 36415 COLL VENOUS BLD VENIPUNCTURE: CPT

## 2021-03-27 PROCEDURE — 83036 HEMOGLOBIN GLYCOSYLATED A1C: CPT

## 2021-03-27 PROCEDURE — 80053 COMPREHEN METABOLIC PANEL: CPT

## 2021-03-27 PROCEDURE — 82306 VITAMIN D 25 HYDROXY: CPT

## 2021-03-29 NOTE — PROGRESS NOTES
Stable labs except mild anemia. Take multivitamin and iron rich diet. Further discussion on follow up visit.

## 2021-04-06 NOTE — PROGRESS NOTES
Contacted patient and verified identity using name and date of birth (2- identifiers)  Spoke with patient and she verbalized understanding of Stable labs except mild anemia. Take multivitamin and iron rich diet.  Further discussion on follow up visit

## 2021-04-12 ENCOUNTER — HOSPITAL ENCOUNTER (OUTPATIENT)
Dept: ULTRASOUND IMAGING | Age: 36
Discharge: HOME | End: 2021-04-12
Attending: FAMILY MEDICINE
Payer: OTHER GOVERNMENT

## 2021-04-12 PROCEDURE — 76536 US EXAM OF HEAD AND NECK: CPT

## 2021-04-26 ENCOUNTER — OFFICE VISIT (OUTPATIENT)
Dept: FAMILY MEDICINE CLINIC | Age: 36
End: 2021-04-26
Payer: OTHER GOVERNMENT

## 2021-04-26 VITALS
BODY MASS INDEX: 29.67 KG/M2 | HEIGHT: 66 IN | RESPIRATION RATE: 16 BRPM | DIASTOLIC BLOOD PRESSURE: 76 MMHG | HEART RATE: 67 BPM | SYSTOLIC BLOOD PRESSURE: 106 MMHG | TEMPERATURE: 97.6 F | WEIGHT: 184.6 LBS | OXYGEN SATURATION: 98 %

## 2021-04-26 DIAGNOSIS — E04.9 ENLARGED THYROID GLAND: ICD-10-CM

## 2021-04-26 DIAGNOSIS — F32.89 OTHER DEPRESSION: Primary | ICD-10-CM

## 2021-04-26 DIAGNOSIS — Z87.898 H/O SYNCOPE: ICD-10-CM

## 2021-04-26 PROCEDURE — 99213 OFFICE O/P EST LOW 20 MIN: CPT | Performed by: FAMILY MEDICINE

## 2021-04-26 RX ORDER — SERTRALINE HYDROCHLORIDE 25 MG/1
25 TABLET, FILM COATED ORAL DAILY
Qty: 30 TAB | Refills: 0 | Status: SHIPPED | OUTPATIENT
Start: 2021-04-26 | End: 2021-09-22

## 2021-04-26 NOTE — PATIENT INSTRUCTIONS
Learning About Antidepressants What are antidepressants? Antidepressants are medicines that change the levels of some chemicals in the brain. They can help affect moods. There are different types. They work on brain chemicals in different ways. These medicines can help treat depression. They are also used for anxiety, eating disorders, post-traumatic stress disorder, and chronic pain. What are some examples? There are many types of antidepressants. They include: 
Selective serotonin reuptake inhibitors (SSRIs). Some examples are: 
Citalopram. 
Fluoxetine. Sertraline. Serotonin and norepinephrine reuptake inhibitors (SNRIs). Some examples are: 
Duloxetine. Venlafaxine. Atypical antidepressants. These include: 
Bupropion. Mirtazapine. Trazodone. Tricyclic and tetracyclic antidepressants. These include: 
Amitriptyline. Nortriptyline. Monoamine oxidase inhibitors (MAOIs). An example is selegiline. What are the side effects? Side effects may vary. They depend on the medicine you take. But common ones include: 
· Nausea. · Dry mouth. · Loss of appetite. · Diarrhea or constipation. · Sexual problems. (These may include loss of desire or erection problems.) · Headaches. · Trouble falling asleep. Or you may wake up a lot at night. · Weight gain. · Feeling nervous or on edge. · Feeling drowsy in the daytime. Problems with sexual arousal and a lack of interest in sex are common side effects. If this happens to you, talk to your doctor. There are other medicines that may help with these problems. Mild side effects may go away after you take the medicine for a few weeks. If the side effects bother you, talk with your doctor. You may need a different medicine. Or the doctor may suggest ways to manage your side effects. How do you take antidepressants safely? If your doctor has prescribed antidepressants, try these tips. They can help you get the most from your medicine.  
· Share your Pt called with she will contact  office before scheduling surgery with  she wants to see if  may possibly need to have surgery with her before .   health history with your doctor. Tell your doctor about your other medicines and health conditions. These things can affect which antidepressant your doctor prescribes for you. Tell your doctor if you or anyone in your family has had bipolar disorder or used antidepressants before. · Take your medicine as prescribed. It works best and has fewer side effects when you take it exactly as your doctor prescribed. If you miss a dose and it's not too late in the day, you can still take it. Don't double up doses. · Keep taking your medicine for a while. Take it for at least 6 months after you feel better. This can help keep you from getting symptoms again. · Be prepared to try different medicines and doses. You may start to feel better within 1 to 3 weeks after you start the medicine. If you have not improved at all in 3 weeks, your doctor may increase your dose. Or you may need to try a different medicine. Over time, your doctor may need to adjust your dose again to manage your symptoms better. · Don't take any new medicines unless you talk to your doctor first.  
Even common medicines like aspirin and some vitamins and herbs can cause problems if you use them while you take antidepressants. · Do not drink alcohol. It can make the side effects worse. · Don't stop taking your medicine on your own. Quitting antidepressants too quickly can cause withdrawal symptoms. It can also cause depression to come back. If you are having a problem with your medicine or are ready to quit taking it, work with your doctor. They can help you to slowly reduce the dose over a span of a few weeks. · Call your doctor right away for serious side effects. Watch for: 
? Warning signs of suicide. These include talking or writing about death, giving away belongings, and withdrawing from family and friends. ? Manic behavior.  This includes having very high energy, sleeping less than normal, being impulsive, or being grouchy or restless. ? Serotonin syndrome. This can happen if you take too much antidepressant medicine or take more than one type of medicine that affects serotonin. Signs may include high body temperature, clumsiness, nausea, and feeling restless. How might you feel about taking antidepressants? You may feel nervous or relieved if your doctor talks to you about antidepressants. Or you might feel a bit surprised. And the thought of having to take medicine for a long time can be scary. But whether you're depressed or you have another condition, you're taking a step to help yourself feel better. Follow-up care is a key part of your treatment and safety. Be sure to make and go to all appointments, and call your doctor if you are having problems. It's also a good idea to know your test results and keep a list of the medicines you take. Where can you learn more? Go to http://www.gray.com/ Enter A230 in the search box to learn more about \"Learning About Antidepressants. \" Current as of: September 23, 2020               Content Version: 12.8 © 2006-2021 [employer]. Care instructions adapted under license by [employer] (which disclaims liability or warranty for this information). If you have questions about a medical condition or this instruction, always ask your healthcare professional. [address] any warranty or liability for your use of this information. Low Blood Pressure: Care Instructions Your Care Instructions Blood pressure is a measurement of the force of the blood against the walls of the blood vessels during and after each beat of the heart. Low blood pressure is also called hypotension. It means that your blood pressure is much lower than normal. Some people, especially young, slim women, may have slightly low blood pressure without symptoms.  But in many people, low blood pressure can cause symptoms such as feeling dizzy or lightheaded. When your blood pressure is too low, your heart, brain, and other organs do not get enough blood. Low blood pressure can be caused by many things, including heart problems and some medicines. Diabetes that is not under control can cause your blood pressure to drop. And so can a severe allergic reaction or infection. Another cause is dehydration, which is when your body loses too much fluid. Treatment for low blood pressure depends on the cause. Follow-up care is a key part of your treatment and safety. Be sure to make and go to all appointments, and call your doctor if you are having problems. It's also a good idea to know your test results and keep a list of the medicines you take. How can you care for yourself at home? · Be safe with medicines. Call your doctor if you think you are having a problem with your medicine. You will get more details on the specific medicines your doctor prescribes. · If you feel dizzy or lightheaded, sit down or lie down for a few minutes. Or you can sit down and put your head between your knees. This will help your blood pressure go back to normal and help your symptoms go away. · Follow your doctor's suggestions for ways to prevent symptoms like dizziness. These suggestions may include: ? Get up slowly from bed or after sitting for a long time. If you are in bed, roll to your side and swing your legs over the edge of the bed and onto the floor. Push your body up to a sitting position. Wait for a while before you slowly stand up. 
? Add more salt to your diet, if your doctor recommends it. ? Drink plenty of fluids. Choose water and other caffeine-free clear liquids. If you have kidney, heart, or liver disease and have to limit fluids, talk with your doctor before you increase the amount of fluids you drink. ? Avoid or limit alcohol to 2 drinks a day for men and 1 drink a day for women. Alcohol may interfere with your medicine.  In addition, alcohol can make your low blood pressure worse by causing your body to lose water. ? Avoid or limit caffeine. Caffeine can cause your body to lose water. ? Wear compression stockings to help improve blood flow. When should you call for help? Call 911 anytime you think you may need emergency care. For example, call if: 
  · You passed out (lost consciousness). Call your doctor now or seek immediate medical care if: 
  · You are dizzy or lightheaded, or you feel like you may faint. Watch closely for changes in your health, and be sure to contact your doctor if you have any problems. Where can you learn more? Go to http://www.gray.com/ Enter C304 in the search box to learn more about \"Low Blood Pressure: Care Instructions. \" Current as of: August 31, 2020               Content Version: 12.8 © 8756-9602 [employer]. Care instructions adapted under license by [employer] (which disclaims liability or warranty for this information). If you have questions about a medical condition or this instruction, always ask your healthcare professional. [place] [address] any warranty or liability for your use of this information.

## 2021-04-26 NOTE — PROGRESS NOTES
1. Have you been to the ER, urgent care clinic since your last visit? Hospitalized since your last visit? No    2. Have you seen or consulted any other health care providers outside of the [address] [place] [address] since your last visit? Include any pap smears or colon screening.  No      Chief Complaint   Patient presents with    Depression    Vitamin D Deficiency    Dizziness     history of syncope    Thyroid Problem     enlarged thyroid

## 2021-04-26 NOTE — PROGRESS NOTES
HISTORY OF PRESENT ILLNESS  Wesley Azar is a 38 y.o. female. HPI: Here for follow up . Currently working on tapering zoloft dose. Doing well. Now on 25mg. Advised to take alternate day and then every 2 days. No side effects. Sitting comfortable and not appeared in any acute distress. Denies any headache, dizziness, no chest pain or trouble breathing, no arm or leg weakness. No nausea or vomiting, no weight or appetite changes, no mood changes . No urine or bowel complains, no palpitation, no diaphoresis. No abdominal pain. No cold or cough. No leg swelling. No fever. No sleep trouble. Also blood pressure on soften side. H/o syncope. See last visit note for more details. Work up done by Dr. Brenna Oliveira at [facility]. For now no recurrent episode. She was told that has PVC on holter. Review echo result through care everywhere done in 2016. Last visit noted palpable ? Enlarge thyroid gland. Ultrasound showed borderline large gland. No nodule. Visit Vitals  /76 (BP 1 Location: Left upper arm, BP Patient Position: Sitting, BP Cuff Size: Adult)   Pulse 67   Temp 97.6 °F (36.4 °C) (Oral)   Resp 16   Ht 5' 6\" (1.676 m)   Wt 184 lb 9.6 oz (83.7 kg)   SpO2 98%   BMI 29.80 kg/m²     Review medication list, vitals, problem list,allergies.    Lab Results   Component Value Date/Time    WBC 7.3 03/27/2021 10:33 AM    HGB 11.6 (L) 03/27/2021 10:33 AM    HCT 36.4 03/27/2021 10:33 AM    PLATELET 618 17/42/1448 10:33 AM    MCV 87.7 03/27/2021 10:33 AM     Lab Results   Component Value Date/Time    Sodium 139 03/27/2021 10:33 AM    Potassium 4.6 03/27/2021 10:33 AM    Chloride 109 03/27/2021 10:33 AM    CO2 28 03/27/2021 10:33 AM    Anion gap 2 (L) 03/27/2021 10:33 AM    Glucose 83 03/27/2021 10:33 AM    BUN 13 03/27/2021 10:33 AM    Creatinine 0.53 (L) 03/27/2021 10:33 AM    BUN/Creatinine ratio 25 (H) 03/27/2021 10:33 AM    GFR est AA >60 03/27/2021 10:33 AM    GFR est non-AA >60 03/27/2021 10:33 AM    Calcium 8.6 03/27/2021 10:33 AM    Bilirubin, total 0.2 03/27/2021 10:33 AM    Alk. phosphatase 57 03/27/2021 10:33 AM    Protein, total 7.0 03/27/2021 10:33 AM    Albumin 3.5 03/27/2021 10:33 AM    Globulin 3.5 03/27/2021 10:33 AM    A-G Ratio 1.0 03/27/2021 10:33 AM    ALT (SGPT) 13 03/27/2021 10:33 AM    AST (SGOT) 10 03/27/2021 10:33 AM     Lab Results   Component Value Date/Time    Cholesterol, total 169 03/27/2021 10:33 AM    HDL Cholesterol 50 03/27/2021 10:33 AM    LDL, calculated 103.2 (H) 03/27/2021 10:33 AM    VLDL, calculated 15.8 03/27/2021 10:33 AM    Triglyceride 79 03/27/2021 10:33 AM    CHOL/HDL Ratio 3.4 03/27/2021 10:33 AM     Lab Results   Component Value Date/Time    TSH 1.81 03/27/2021 10:33 AM     Lab Results   Component Value Date/Time    Hemoglobin A1c 5.3 03/27/2021 10:33 AM     Lab Results   Component Value Date/Time    Vitamin D 25-Hydroxy 32.2 03/27/2021 10:33 AM       US Results (most recent):  Results from Orders Only encounter on 03/26/21   US THYROID/PARATHYROID/SOFT TISS    Narrative EXAM: THYROID/SOFT TISSUE NECK ULTRASOUND    CLINICAL HISTORY/INDICATION: Thyroid enlargement      COMPARISON: None. TECHNIQUE: Selected images from thyroid ultrasound obtained. Color flow used as  needed. FINDINGS:    The right lobe of the thyroid measures 2.4 x 1.2 x 4.8 cm. The echogenicity is  homogeneous. The left lobe of the thyroid measures 1.5 x 1 x 4.2 cm. The echogenicity is  homogeneous. The isthmus measures 0.2 cm. Impression Top normal size to minimally enlarged thyroid. No nodules. ROS: see HPI     Physical Exam  Constitutional:       General: She is not in acute distress. Neck:      Musculoskeletal: Neck supple. Cardiovascular:      Rate and Rhythm: Normal rate and regular rhythm. Heart sounds: Normal heart sounds. Abdominal:      General: Bowel sounds are normal.      Palpations: Abdomen is soft. Tenderness: There is no abdominal tenderness. Lymphadenopathy:      Cervical: No cervical adenopathy. Neurological:      Mental Status: She is oriented to person, place, and time. Psychiatric:         Behavior: Behavior normal.         ASSESSMENT and PLAN    ICD-10-CM ICD-9-CM    1. Other depression : for now tapering zoloft as she feels good. She will be completely off over 2 wks. If any concern she will call back. F32.89 311 sertraline (ZOLOFT) 25 mg tablet   2. H/O syncope : no episode since 2016. Work up negative. Was told PVC. Said if recurrent episode need tilt table test. As it has been long time and still blood pressure of soften side I have advise her to make a follow up appt with cardiologist.  L59.246 V15.89    3. Enlarged thyroid gland : asymptomatic. Will observe. Ultrasound showed no abnormal findings. Thyroid function wnl. E04.9 240.9    Pt understood and agree with the plan   Review hM  Pap smear done last year with Dr. Dennis Garnica. Nurse to advise to obtain records. Follow-up and Dispositions    · Return in about 4 months (around 8/26/2021). Please note that this dictation was completed with [employer] the [employer] voice recognition software. Quite often unanticipated grammatical, syntax, homophones, and other interpretive errors are inadvertently transcribed by the computer software. Please disregard these errors. Please excuse any errors that have escaped final proofreading.

## 2021-09-15 ENCOUNTER — TELEPHONE (OUTPATIENT)
Dept: FAMILY MEDICINE CLINIC | Age: 36
End: 2021-09-15

## 2021-09-15 DIAGNOSIS — I49.3 PVC (PREMATURE VENTRICULAR CONTRACTION): Primary | ICD-10-CM

## 2021-09-15 NOTE — TELEPHONE ENCOUNTER
Patient is requesting (New  Updated) referral to the following office:    Speciality: Cardiologist  Specialist Name: Dr. Kerry Cisnerso  Office Location: [id number] [place], [place] [address] [place] [address]   Phone (if available): [phone]  Fax (if available): [phone]  Diagnosis: PBC's  Date of appointment (if scheduled): Not scheduled yet    Pt wants to know if you have any old cardiology records if you could send them also.

## 2022-03-19 PROBLEM — O44.00 PLACENTA PREVIA: Status: ACTIVE | Noted: 2017-10-02

## 2022-03-20 PROBLEM — N93.9 VAGINAL BLEEDING: Status: ACTIVE | Noted: 2017-10-01

## 2022-06-30 NOTE — TELEPHONE ENCOUNTER
Patient identified with 2 identifiers (name and ).  Patient aware the medication has been switched to bactroban regular strength and to apply to nositril twice a day x 7 days Tachypnea; hypotension

## 2022-07-27 ENCOUNTER — OFFICE VISIT (OUTPATIENT)
Dept: FAMILY MEDICINE CLINIC | Age: 37
End: 2022-07-27
Payer: OTHER GOVERNMENT

## 2022-07-27 VITALS
OXYGEN SATURATION: 98 % | WEIGHT: 192.6 LBS | RESPIRATION RATE: 16 BRPM | SYSTOLIC BLOOD PRESSURE: 106 MMHG | TEMPERATURE: 97.8 F | HEART RATE: 71 BPM | BODY MASS INDEX: 31.09 KG/M2 | DIASTOLIC BLOOD PRESSURE: 60 MMHG

## 2022-07-27 DIAGNOSIS — R59.0 AXILLARY LYMPHADENOPATHY: ICD-10-CM

## 2022-07-27 DIAGNOSIS — R05.9 COUGH: Primary | ICD-10-CM

## 2022-07-27 PROCEDURE — 99213 OFFICE O/P EST LOW 20 MIN: CPT | Performed by: FAMILY MEDICINE

## 2022-07-27 RX ORDER — FLUTICASONE PROPIONATE 110 UG/1
1 AEROSOL, METERED RESPIRATORY (INHALATION) EVERY 12 HOURS
Qty: 12 G | Refills: 0 | Status: SHIPPED | OUTPATIENT
Start: 2022-07-27

## 2022-07-27 RX ORDER — FLUTICASONE PROPIONATE 110 UG/1
1 AEROSOL, METERED RESPIRATORY (INHALATION) EVERY 12 HOURS
Qty: 12 G | Refills: 0 | Status: SHIPPED | OUTPATIENT
Start: 2022-07-27 | End: 2022-07-27 | Stop reason: SDUPTHER

## 2022-07-27 NOTE — PROGRESS NOTES
HISTORY OF PRESENT ILLNESS  Mimi Whitfield is a 43 y.o. female. HPI: Here with a concern of cough since almost a month. She had done COVID test twice consistently and started and both were negative. She is fully vaccinated. Went to urgent care at that time. Given Augmentin but made no difference. Denies any wheezing. Occasionally cough bouts. Currently clear sputum on and off. No wheezing. No shortness of breath. Able to talk in full sentence. Sitting comfortable without any acute distress. Denies any itchy throat or itchy nose. No sore throat. No fever. No history of asthma. Former smoker. Also noted bulge over the right axillary area. Questionable lymph node. Looks like lipoma. Will obtain ultrasound. Currently she is 26 weeks pregnant. Following OB with compliance. No pelvic pain or vaginal discharge. Visit Vitals  /60 (BP 1 Location: Left upper arm, BP Patient Position: Sitting, BP Cuff Size: Adult)   Pulse 71   Temp 97.8 °F (36.6 °C) (Temporal)   Resp 16   Wt 192 lb 9.6 oz (87.4 kg)   SpO2 98%   BMI 31.09 kg/m²     Review medication list, vitals, problem list,allergies. ROS: See HPI    Physical Exam  Cardiovascular:      Rate and Rhythm: Normal rate. Pulmonary:      Effort: Pulmonary effort is normal. No respiratory distress. Breath sounds: No wheezing. Neurological:      Mental Status: She is alert and oriented to person, place, and time. Psychiatric:         Behavior: Behavior normal.       ASSESSMENT and PLAN    ICD-10-CM ICD-9-CM    1. Cough: Going on since 1 month. Negative COVID home test twice. Urgent care was given Augmentin but no change. Gradually improving. No history of asthma. Former smoker. Given Flovent to take once a day as she is pregnant 26 weeks. She has an appointment with OB tomorrow and she will discuss the plan with them as well.   Advised to take the inhaler for a week and if still symptoms persist can prolong 1 more week once a day R05.9 786.2 fluticasone propionate (FLOVENT HFA) 110 mcg/actuation inhaler      2. Axillary lymphadenopathy: Unilateral over right axillary area. Questionable lipoma versus lymph node. Ordered ultrasound R59.0 785.6 US EXT NONVAS RT LTD      Patient understood agreed with the plan  Follow-up and Dispositions    Return for after pregnancy 2 months postpartum . Please note that this dictation was completed with [employer], the computer voice recognition software. Quite often unanticipated grammatical, syntax, homophones, and other interpretive errors are inadvertently transcribed by the computer software. Please disregard these errors. Please excuse any errors that have escaped final proofreading.

## 2022-07-27 NOTE — PROGRESS NOTES
1. Have you been to the ER, urgent care clinic since your last visit? Hospitalized since your last visit? [facility] 6/2022    2. Have you seen or consulted any other health care providers outside of the [address] [place] [address] since your last visit? Include any pap smears or colon screening. OB/GYN - Dr. Terrell Dallas  LOV: 3-4 weeks ago.     Chief Complaint   Patient presents with    Cough     Productive with clear mucus x 6 weeks    Skin Problem     Discoloration in right arm pit

## 2022-08-26 ENCOUNTER — HOSPITAL ENCOUNTER (OUTPATIENT)
Dept: ULTRASOUND IMAGING | Age: 37
Discharge: HOME | End: 2022-08-26
Attending: FAMILY MEDICINE
Payer: OTHER GOVERNMENT

## 2022-08-26 DIAGNOSIS — R59.0 AXILLARY LYMPHADENOPATHY: ICD-10-CM

## 2022-08-26 PROCEDURE — 76642 ULTRASOUND BREAST LIMITED: CPT

## 2022-08-30 NOTE — PROGRESS NOTES
Unremarkable ultrasound. If still symptomatic and persistent discomfort can consider breast specialist referral. Please ask pt.

## 2023-01-31 DIAGNOSIS — R59.0 AXILLARY LYMPHADENOPATHY: Primary | ICD-10-CM

## 2023-01-31 RX ORDER — FLUTICASONE PROPIONATE 110 UG/1
1 AEROSOL, METERED RESPIRATORY (INHALATION) EVERY 12 HOURS
COMMUNITY
Start: 2022-07-27

## 2023-01-31 RX ORDER — IBUPROFEN 600 MG/1
600 TABLET ORAL EVERY 6 HOURS PRN
COMMUNITY
Start: 2022-10-27

## 2023-01-31 RX ORDER — SENNA PLUS 8.6 MG/1
17.2 TABLET ORAL
COMMUNITY
Start: 2022-10-27

## 2023-02-03 DIAGNOSIS — R59.0 AXILLARY LYMPHADENOPATHY: Primary | ICD-10-CM

## 2023-05-28 NOTE — PROGRESS NOTES
[employer] message sent.  Patient advised to start OTC vit D3 1,000-2,000 units daily Name band;

## 2023-09-29 SDOH — ECONOMIC STABILITY: TRANSPORTATION INSECURITY
IN THE PAST 12 MONTHS, HAS LACK OF TRANSPORTATION KEPT YOU FROM MEETINGS, WORK, OR FROM GETTING THINGS NEEDED FOR DAILY LIVING?: NO

## 2023-09-29 SDOH — ECONOMIC STABILITY: FOOD INSECURITY: WITHIN THE PAST 12 MONTHS, THE FOOD YOU BOUGHT JUST DIDN'T LAST AND YOU DIDN'T HAVE MONEY TO GET MORE.: NEVER TRUE

## 2023-09-29 SDOH — ECONOMIC STABILITY: INCOME INSECURITY: HOW HARD IS IT FOR YOU TO PAY FOR THE VERY BASICS LIKE FOOD, HOUSING, MEDICAL CARE, AND HEATING?: SOMEWHAT HARD

## 2023-09-29 SDOH — ECONOMIC STABILITY: FOOD INSECURITY: WITHIN THE PAST 12 MONTHS, YOU WORRIED THAT YOUR FOOD WOULD RUN OUT BEFORE YOU GOT MONEY TO BUY MORE.: NEVER TRUE

## 2023-09-29 SDOH — ECONOMIC STABILITY: HOUSING INSECURITY
IN THE LAST 12 MONTHS, WAS THERE A TIME WHEN YOU DID NOT HAVE A STEADY PLACE TO SLEEP OR SLEPT IN A SHELTER (INCLUDING NOW)?: NO

## 2023-10-02 ENCOUNTER — OFFICE VISIT (OUTPATIENT)
Age: 38
End: 2023-10-02
Payer: OTHER GOVERNMENT

## 2023-10-02 VITALS
SYSTOLIC BLOOD PRESSURE: 120 MMHG | OXYGEN SATURATION: 99 % | WEIGHT: 206 LBS | DIASTOLIC BLOOD PRESSURE: 72 MMHG | HEART RATE: 69 BPM | BODY MASS INDEX: 33.25 KG/M2 | TEMPERATURE: 97.3 F | RESPIRATION RATE: 16 BRPM

## 2023-10-02 DIAGNOSIS — R05.1 ACUTE COUGH: Primary | ICD-10-CM

## 2023-10-02 PROCEDURE — 99213 OFFICE O/P EST LOW 20 MIN: CPT | Performed by: FAMILY MEDICINE

## 2023-10-02 RX ORDER — AZITHROMYCIN 250 MG/1
250 TABLET, FILM COATED ORAL SEE ADMIN INSTRUCTIONS
Qty: 6 TABLET | Refills: 0 | Status: SHIPPED | OUTPATIENT
Start: 2023-10-02 | End: 2023-10-07

## 2023-10-02 RX ORDER — ALBUTEROL SULFATE 90 UG/1
2 AEROSOL, METERED RESPIRATORY (INHALATION) EVERY 6 HOURS PRN
Qty: 18 G | Refills: 3 | Status: SHIPPED | OUTPATIENT
Start: 2023-10-02

## 2023-10-02 RX ORDER — VALACYCLOVIR HYDROCHLORIDE 500 MG/1
TABLET, FILM COATED ORAL
COMMUNITY
Start: 2022-08-31

## 2023-10-02 ASSESSMENT — PATIENT HEALTH QUESTIONNAIRE - PHQ9
SUM OF ALL RESPONSES TO PHQ QUESTIONS 1-9: 1
2. FEELING DOWN, DEPRESSED OR HOPELESS: 1
SUM OF ALL RESPONSES TO PHQ QUESTIONS 1-9: 1
SUM OF ALL RESPONSES TO PHQ QUESTIONS 1-9: 1
SUM OF ALL RESPONSES TO PHQ9 QUESTIONS 1 & 2: 1
1. LITTLE INTEREST OR PLEASURE IN DOING THINGS: 0
SUM OF ALL RESPONSES TO PHQ QUESTIONS 1-9: 1

## 2023-10-02 NOTE — PROGRESS NOTES
1. \"Have you been to the ER, urgent care clinic since your last visit? Hospitalized since your last visit? \" Yes When: [facility] 10/25/22 for . 2. \"Have you seen or consulted any other health care providers outside of the [address] [place] [address] Avenue since your last visit? \" Dermatology for annual skin exam LOV: 2023. [facility]:     Chief Complaint   Patient presents with    Cough     Productive cough with yellowish-clear mucus.  Mucus was green was 1.5 weeks ago

## 2023-11-08 NOTE — PROGRESS NOTES
1. \"Have you been to the ER, urgent care clinic since your last visit? Hospitalized since your last visit? \" No    2. \"Have you seen or consulted any other health care providers outside of the [address] [place] [address] since your last visit? \" [facility] for flu vaccine on 10/04/23. 3. For patients aged 43-73: Has the patient had a colonoscopy / FIT/ Cologuard? NA - based on age      If the patient is female:    4. For patients aged 43-66: Has the patient had a mammogram within the past 2 years? No      5. For patients aged 21-65: Has the patient had a pap smear? Yes - no Care Gap present 6/22/23 - Dr. Jeffrey Spence. Chief Complaint   Patient presents with    Annual Exam     Chronic cough. Depression/anxiety concern           Patient had flu and covid vaccine in 10/2023. Will obtain record for covid vaccine.

## 2023-11-09 ENCOUNTER — OFFICE VISIT (OUTPATIENT)
Age: 38
End: 2023-11-09

## 2023-11-09 VITALS
HEIGHT: 65 IN | OXYGEN SATURATION: 98 % | RESPIRATION RATE: 16 BRPM | BODY MASS INDEX: 34.66 KG/M2 | SYSTOLIC BLOOD PRESSURE: 110 MMHG | DIASTOLIC BLOOD PRESSURE: 70 MMHG | WEIGHT: 208 LBS | HEART RATE: 64 BPM | TEMPERATURE: 97.2 F

## 2023-11-09 DIAGNOSIS — R05.3 CHRONIC COUGH: ICD-10-CM

## 2023-11-09 DIAGNOSIS — L60.0 INGROWN NAIL OF GREAT TOE: ICD-10-CM

## 2023-11-09 DIAGNOSIS — Z00.00 ENCOUNTER FOR WELL ADULT EXAM WITHOUT ABNORMAL FINDINGS: Primary | ICD-10-CM

## 2023-11-09 DIAGNOSIS — F41.8 DEPRESSION WITH ANXIETY: ICD-10-CM

## 2023-11-09 DIAGNOSIS — E61.1 IRON DEFICIENCY: ICD-10-CM

## 2023-11-09 DIAGNOSIS — Z13.220 SCREENING FOR HYPERLIPIDEMIA: ICD-10-CM

## 2023-11-09 DIAGNOSIS — Z13.1 SCREENING FOR DIABETES MELLITUS: ICD-10-CM

## 2023-11-09 RX ORDER — BUSPIRONE HYDROCHLORIDE 5 MG/1
5 TABLET ORAL 2 TIMES DAILY
Qty: 60 TABLET | Refills: 2 | Status: SHIPPED | OUTPATIENT
Start: 2023-11-09

## 2023-11-09 ASSESSMENT — PATIENT HEALTH QUESTIONNAIRE - PHQ9
SUM OF ALL RESPONSES TO PHQ QUESTIONS 1-9: 0
2. FEELING DOWN, DEPRESSED OR HOPELESS: 0
SUM OF ALL RESPONSES TO PHQ QUESTIONS 1-9: 0
SUM OF ALL RESPONSES TO PHQ QUESTIONS 1-9: 0
1. LITTLE INTEREST OR PLEASURE IN DOING THINGS: 0
SUM OF ALL RESPONSES TO PHQ9 QUESTIONS 1 & 2: 0
SUM OF ALL RESPONSES TO PHQ QUESTIONS 1-9: 0

## 2023-11-09 ASSESSMENT — ANXIETY QUESTIONNAIRES
7. FEELING AFRAID AS IF SOMETHING AWFUL MIGHT HAPPEN: 0
6. BECOMING EASILY ANNOYED OR IRRITABLE: 2
2. NOT BEING ABLE TO STOP OR CONTROL WORRYING: 1
5. BEING SO RESTLESS THAT IT IS HARD TO SIT STILL: 2
1. FEELING NERVOUS, ANXIOUS, OR ON EDGE: 2
GAD7 TOTAL SCORE: 11
4. TROUBLE RELAXING: 3
IF YOU CHECKED OFF ANY PROBLEMS ON THIS QUESTIONNAIRE, HOW DIFFICULT HAVE THESE PROBLEMS MADE IT FOR YOU TO DO YOUR WORK, TAKE CARE OF THINGS AT HOME, OR GET ALONG WITH OTHER PEOPLE: VERY DIFFICULT
3. WORRYING TOO MUCH ABOUT DIFFERENT THINGS: 1

## 2023-11-09 NOTE — PROGRESS NOTES
Well Adult Note  Name: Rhianna Henderson Date: 2023   MRN: [id number] Sex: Female   Age: 42 y.o. Ethnicity:  / Anmol Sanchezs   : 1985 Race: White (non-)      Marcia Kim is here for well adult exam.  History:  Regular menstrual period. No concern. Not sexually active at this time. No prior years ago had an abnormal pap smear. No vaginal discharge or pelvic pain. Has h/o genital herpes. No active lesion at this time. Does on and off self breast exam. No concern. No family history or cervical ,ovarina, breast or colon cancer. No family history of sudden cardiac death. Father had blood clot after his surgery. Concern with some feeling over rt foot. Not able to explain it. No leg claudication or cramps. Advised b complex vitamin along with prenatal vitamin. Recheck labs as noted previously low iron. Had big hematoma after pop sound over her left calf area few years back. No pain over calf or leg at this time. Mother has h/o varicose vein. On exam no varicose vain noted. Anxiety. Routine activity. No panic attack. Some depression. Lack of motivation to do routine activity. Tried zoloft in the past , did not help. Feels anxious more lately. Discuss buspar and agree to do it. Sitting comfortable. Did not appear in any acute distress. Sleep is fair. Appetite is fair. Discussed high Bmi and importance of exercise as weight loss. Agree to do preventive labs. Vitals stable. Does go for yearly dental exam.  Does wear prescription glasses. Will make a routine eye exam appt. Cough since last 2 months. On and off. Day time. Taking zyrtec. No wheezing. No chest congestion. No sob. Review of Systems: Denies any headache, dizziness, no chest pain or trouble breathing, no arm or leg weakness. No nausea or vomiting, no weight or appetite changes, no mood changes . No urine or bowel complains, no palpitation, no diaphoresis. No abdominal pain. No leg swelling. No fever.

## 2023-11-15 ENCOUNTER — HOSPITAL ENCOUNTER (OUTPATIENT)
Facility: HOSPITAL | Age: 38
Discharge: HOME | End: 2023-11-18
Payer: OTHER GOVERNMENT

## 2023-11-15 DIAGNOSIS — F41.8 DEPRESSION WITH ANXIETY: ICD-10-CM

## 2023-11-15 DIAGNOSIS — E61.1 IRON DEFICIENCY: ICD-10-CM

## 2023-11-15 DIAGNOSIS — R05.3 CHRONIC COUGH: ICD-10-CM

## 2023-11-15 DIAGNOSIS — Z13.1 SCREENING FOR DIABETES MELLITUS: ICD-10-CM

## 2023-11-15 DIAGNOSIS — Z13.220 SCREENING FOR HYPERLIPIDEMIA: ICD-10-CM

## 2023-11-15 LAB
ALBUMIN SERPL-MCNC: 3.5 G/DL (ref 3.4–5)
ALBUMIN/GLOB SERPL: 0.9 (ref 0.8–1.7)
ALP SERPL-CCNC: 66 U/L (ref 45–117)
ALT SERPL-CCNC: 15 U/L (ref 13–56)
ANION GAP SERPL CALC-SCNC: 3 MMOL/L (ref 3–18)
AST SERPL-CCNC: 10 U/L (ref 10–38)
BILIRUB SERPL-MCNC: 0.3 MG/DL (ref 0.2–1)
BUN SERPL-MCNC: 10 MG/DL (ref 7–18)
BUN/CREAT SERPL: 14 (ref 12–20)
CALCIUM SERPL-MCNC: 9.1 MG/DL (ref 8.5–10.1)
CHLORIDE SERPL-SCNC: 108 MMOL/L (ref 100–111)
CHOLEST SERPL-MCNC: 195 MG/DL
CO2 SERPL-SCNC: 31 MMOL/L (ref 21–32)
CREAT SERPL-MCNC: 0.71 MG/DL (ref 0.6–1.3)
ERYTHROCYTE [DISTWIDTH] IN BLOOD BY AUTOMATED COUNT: 12.8 % (ref 11.6–14.5)
EST. AVERAGE GLUCOSE BLD GHB EST-MCNC: 103 MG/DL
GLOBULIN SER CALC-MCNC: 3.7 G/DL (ref 2–4)
GLUCOSE SERPL-MCNC: 108 MG/DL (ref 74–99)
HBA1C MFR BLD: 5.2 % (ref 4.2–5.6)
HCT VFR BLD AUTO: 39.4 % (ref 35–45)
HDLC SERPL-MCNC: 48 MG/DL (ref 40–60)
HDLC SERPL: 4.1 (ref 0–5)
HGB BLD-MCNC: 12.3 G/DL (ref 12–16)
IRON SATN MFR SERPL: 17 % (ref 20–50)
IRON SERPL-MCNC: 49 UG/DL (ref 50–175)
LDLC SERPL CALC-MCNC: 104.2 MG/DL (ref 0–100)
LIPID PANEL: ABNORMAL
MCH RBC QN AUTO: 27.6 PG (ref 24–34)
MCHC RBC AUTO-ENTMCNC: 31.2 G/DL (ref 31–37)
MCV RBC AUTO: 88.5 FL (ref 78–100)
NRBC # BLD: 0 K/UL (ref 0–0.01)
NRBC BLD-RTO: 0 PER 100 WBC
PLATELET # BLD AUTO: 359 K/UL (ref 135–420)
PMV BLD AUTO: 9.1 FL (ref 9.2–11.8)
POTASSIUM SERPL-SCNC: 4 MMOL/L (ref 3.5–5.5)
PROT SERPL-MCNC: 7.2 G/DL (ref 6.4–8.2)
RBC # BLD AUTO: 4.45 M/UL (ref 4.2–5.3)
SODIUM SERPL-SCNC: 142 MMOL/L (ref 136–145)
TIBC SERPL-MCNC: 295 UG/DL (ref 250–450)
TRIGL SERPL-MCNC: 214 MG/DL
TSH SERPL DL<=0.05 MIU/L-ACNC: 2.06 UIU/ML (ref 0.36–3.74)
VLDLC SERPL CALC-MCNC: 42.8 MG/DL
WBC # BLD AUTO: 8.2 K/UL (ref 4.6–13.2)

## 2023-11-15 PROCEDURE — 83036 HEMOGLOBIN GLYCOSYLATED A1C: CPT

## 2023-11-15 PROCEDURE — 83550 IRON BINDING TEST: CPT

## 2023-11-15 PROCEDURE — 71046 X-RAY EXAM CHEST 2 VIEWS: CPT

## 2023-11-15 PROCEDURE — 36415 COLL VENOUS BLD VENIPUNCTURE: CPT

## 2023-11-15 PROCEDURE — 85027 COMPLETE CBC AUTOMATED: CPT

## 2023-11-15 PROCEDURE — 80061 LIPID PANEL: CPT

## 2023-11-15 PROCEDURE — 83540 ASSAY OF IRON: CPT

## 2023-11-15 PROCEDURE — 80053 COMPREHEN METABOLIC PANEL: CPT

## 2023-11-15 PROCEDURE — 84443 ASSAY THYROID STIM HORMONE: CPT

## 2024-01-10 ENCOUNTER — OFFICE VISIT (OUTPATIENT)
Age: 39
End: 2024-01-10
Payer: OTHER GOVERNMENT

## 2024-01-10 VITALS
DIASTOLIC BLOOD PRESSURE: 72 MMHG | SYSTOLIC BLOOD PRESSURE: 106 MMHG | RESPIRATION RATE: 16 BRPM | WEIGHT: 210.6 LBS | HEIGHT: 65 IN | HEART RATE: 73 BPM | BODY MASS INDEX: 35.09 KG/M2 | OXYGEN SATURATION: 97 % | TEMPERATURE: 97.2 F

## 2024-01-10 DIAGNOSIS — E61.1 IRON DEFICIENCY: Primary | ICD-10-CM

## 2024-01-10 DIAGNOSIS — F41.8 DEPRESSION WITH ANXIETY: ICD-10-CM

## 2024-01-10 DIAGNOSIS — F41.9 ANXIETY: ICD-10-CM

## 2024-01-10 DIAGNOSIS — R53.82 CHRONIC FATIGUE: ICD-10-CM

## 2024-01-10 DIAGNOSIS — E55.9 VITAMIN D DEFICIENCY: ICD-10-CM

## 2024-01-10 DIAGNOSIS — Z12.31 ENCOUNTER FOR SCREENING MAMMOGRAM FOR MALIGNANT NEOPLASM OF BREAST: ICD-10-CM

## 2024-01-10 PROCEDURE — 99214 OFFICE O/P EST MOD 30 MIN: CPT | Performed by: FAMILY MEDICINE

## 2024-01-10 RX ORDER — BUSPIRONE HYDROCHLORIDE 10 MG/1
10 TABLET ORAL 2 TIMES DAILY
Qty: 180 TABLET | Refills: 0 | Status: SHIPPED | OUTPATIENT
Start: 2024-01-10

## 2024-01-10 RX ORDER — FERROUS SULFATE 325(65) MG
325 TABLET ORAL
Qty: 90 TABLET | Refills: 0 | Status: SHIPPED | OUTPATIENT
Start: 2024-01-10

## 2024-01-10 RX ORDER — CETIRIZINE HYDROCHLORIDE 10 MG/1
10 TABLET ORAL NIGHTLY PRN
COMMUNITY

## 2024-01-10 ASSESSMENT — ANXIETY QUESTIONNAIRES
GAD7 TOTAL SCORE: 7
4. TROUBLE RELAXING: 3
IF YOU CHECKED OFF ANY PROBLEMS ON THIS QUESTIONNAIRE, HOW DIFFICULT HAVE THESE PROBLEMS MADE IT FOR YOU TO DO YOUR WORK, TAKE CARE OF THINGS AT HOME, OR GET ALONG WITH OTHER PEOPLE: SOMEWHAT DIFFICULT
5. BEING SO RESTLESS THAT IT IS HARD TO SIT STILL: 0
1. FEELING NERVOUS, ANXIOUS, OR ON EDGE: 2
6. BECOMING EASILY ANNOYED OR IRRITABLE: 1
3. WORRYING TOO MUCH ABOUT DIFFERENT THINGS: 1
2. NOT BEING ABLE TO STOP OR CONTROL WORRYING: 0
7. FEELING AFRAID AS IF SOMETHING AWFUL MIGHT HAPPEN: 0

## 2024-01-10 ASSESSMENT — PATIENT HEALTH QUESTIONNAIRE - PHQ9
SUM OF ALL RESPONSES TO PHQ QUESTIONS 1-9: 0
SUM OF ALL RESPONSES TO PHQ9 QUESTIONS 1 & 2: 0
SUM OF ALL RESPONSES TO PHQ QUESTIONS 1-9: 0
1. LITTLE INTEREST OR PLEASURE IN DOING THINGS: 0
SUM OF ALL RESPONSES TO PHQ QUESTIONS 1-9: 0
2. FEELING DOWN, DEPRESSED OR HOPELESS: 0
SUM OF ALL RESPONSES TO PHQ QUESTIONS 1-9: 0

## 2024-01-10 NOTE — PROGRESS NOTES
1. \"Have you been to the ER, urgent care clinic since your last visit?  Hospitalized since your last visit?\" No    2. \"Have you seen or consulted any other health care providers outside of the [facility] since your last visit?\" No    Chief Complaint   Patient presents with    Anxiety    Depression    Medication Adjustment     Wants to increase dose buspar and Requesting Rx for Iron        
  Component Value Date/Time    CHOL 195 11/15/2023 07:35 AM    TRIG 214 11/15/2023 07:35 AM    HDL 48 11/15/2023 07:35 AM    LDLCALC 104.2 11/15/2023 07:35 AM    LABVLDL 42.8 11/15/2023 07:35 AM    CHOLHDLRATIO 4.1 11/15/2023 07:35 AM       A1c:   Hemoglobin A1C   Date Value Ref Range Status   11/15/2023 5.2 4.2 - 5.6 % Final     Comment:     (NOTE)  HbA1C Interpretive Ranges  <5.7              Normal  5.7 - 6.4         Consider Prediabetes  >6.5              Consider Diabetes     03/27/2021 5.3 4.2 - 5.6 % Final     Comment:     (NOTE)  HbA1C Interpretive Ranges  <5.7              Normal  5.7 - 6.4         Consider Prediabetes  >6.5              Consider Diabetes           Microalbumin: No results found for: \"MALBCR\"    TSH:   Lab Results   Component Value Date/Time    TSH 1.81 03/27/2021 10:33 AM       Vit D:   Lab Results   Component Value Date/Time    VITD25 32.2 03/27/2021 10:33 AM           No Known Allergies    ROS:  Review of Systems    Medication :  Current Outpatient Medications   Medication Sig Dispense Refill    B Complex Vitamins (VITAMIN B COMPLEX PO) Take by mouth      cetirizine (ZYRTEC) 10 MG tablet Take 1 tablet by mouth nightly as needed for Allergies      ferrous sulfate (IRON 325) 325 (65 Fe) MG tablet Take 1 tablet by mouth daily (with breakfast) 90 tablet 0    busPIRone (BUSPAR) 10 MG tablet Take 1 tablet by mouth 2 times daily 180 tablet 0    Prenatal Vit-Fe Fumarate-FA (PRENATAL VITAMIN PO) Take by mouth      valACYclovir (VALTREX) 500 MG tablet       vitamin D (CHOLECALCIFEROL) 25 MCG (1000 UT) TABS tablet Take by mouth daily      albuterol sulfate HFA (PROAIR HFA) 108 (90 Base) MCG/ACT inhaler Inhale 2 puffs into the lungs every 6 hours as needed for Wheezing 18 g 3    ibuprofen (ADVIL;MOTRIN) 600 MG tablet Take 1 tablet by mouth every 6 hours as needed       No current facility-administered medications for this visit.            OBJECTIVE:  /72 (Site: Left Upper Arm, Position:

## 2024-01-31 ENCOUNTER — HOSPITAL ENCOUNTER (OUTPATIENT)
Facility: HOSPITAL | Age: 39
Discharge: HOME | End: 2024-02-03
Attending: FAMILY MEDICINE
Payer: OTHER GOVERNMENT

## 2024-01-31 VITALS — HEIGHT: 65 IN | BODY MASS INDEX: 35.05 KG/M2

## 2024-01-31 DIAGNOSIS — Z12.31 ENCOUNTER FOR SCREENING MAMMOGRAM FOR MALIGNANT NEOPLASM OF BREAST: ICD-10-CM

## 2024-01-31 PROCEDURE — 77063 BREAST TOMOSYNTHESIS BI: CPT

## 2024-04-10 ENCOUNTER — HOSPITAL ENCOUNTER (OUTPATIENT)
Facility: HOSPITAL | Age: 39
Discharge: HOME | End: 2024-04-13
Payer: OTHER GOVERNMENT

## 2024-04-10 LAB
25(OH)D3 SERPL-MCNC: 52.3 NG/ML (ref 30–100)
ALBUMIN SERPL-MCNC: 3.6 G/DL (ref 3.4–5)
ALBUMIN/GLOB SERPL: 1 (ref 0.8–1.7)
ALP SERPL-CCNC: 65 U/L (ref 45–117)
ALT SERPL-CCNC: 17 U/L (ref 13–56)
ANION GAP SERPL CALC-SCNC: 3 MMOL/L (ref 3–18)
AST SERPL-CCNC: 11 U/L (ref 10–38)
BILIRUB SERPL-MCNC: 0.4 MG/DL (ref 0.2–1)
BUN SERPL-MCNC: 14 MG/DL (ref 7–18)
BUN/CREAT SERPL: 19 (ref 12–20)
CALCIUM SERPL-MCNC: 9.2 MG/DL (ref 8.5–10.1)
CHLORIDE SERPL-SCNC: 105 MMOL/L (ref 100–111)
CO2 SERPL-SCNC: 30 MMOL/L (ref 21–32)
CREAT SERPL-MCNC: 0.73 MG/DL (ref 0.6–1.3)
GLOBULIN SER CALC-MCNC: 3.6 G/DL (ref 2–4)
GLUCOSE SERPL-MCNC: 109 MG/DL (ref 74–99)
IRON SATN MFR SERPL: 18 % (ref 20–50)
IRON SERPL-MCNC: 51 UG/DL (ref 50–175)
POTASSIUM SERPL-SCNC: 4.5 MMOL/L (ref 3.5–5.5)
PROT SERPL-MCNC: 7.2 G/DL (ref 6.4–8.2)
SODIUM SERPL-SCNC: 138 MMOL/L (ref 136–145)
TIBC SERPL-MCNC: 277 UG/DL (ref 250–450)

## 2024-04-10 PROCEDURE — 82306 VITAMIN D 25 HYDROXY: CPT

## 2024-04-10 PROCEDURE — 36415 COLL VENOUS BLD VENIPUNCTURE: CPT

## 2024-04-10 PROCEDURE — 83550 IRON BINDING TEST: CPT

## 2024-04-10 PROCEDURE — 80053 COMPREHEN METABOLIC PANEL: CPT

## 2024-04-10 PROCEDURE — 83540 ASSAY OF IRON: CPT

## 2024-04-15 ENCOUNTER — TELEPHONE (OUTPATIENT)
Age: 39
End: 2024-04-15

## 2024-04-15 ENCOUNTER — OFFICE VISIT (OUTPATIENT)
Age: 39
End: 2024-04-15
Payer: OTHER GOVERNMENT

## 2024-04-15 VITALS
BODY MASS INDEX: 34.35 KG/M2 | RESPIRATION RATE: 16 BRPM | OXYGEN SATURATION: 98 % | DIASTOLIC BLOOD PRESSURE: 76 MMHG | TEMPERATURE: 97.5 F | WEIGHT: 206.2 LBS | HEART RATE: 60 BPM | SYSTOLIC BLOOD PRESSURE: 106 MMHG | HEIGHT: 65 IN

## 2024-04-15 DIAGNOSIS — K64.9 HEMORRHOIDS, UNSPECIFIED HEMORRHOID TYPE: Primary | ICD-10-CM

## 2024-04-15 DIAGNOSIS — F41.9 ANXIETY: ICD-10-CM

## 2024-04-15 DIAGNOSIS — K92.1 BLOOD IN STOOL: ICD-10-CM

## 2024-04-15 DIAGNOSIS — E55.9 VITAMIN D DEFICIENCY: ICD-10-CM

## 2024-04-15 DIAGNOSIS — E61.1 IRON DEFICIENCY: ICD-10-CM

## 2024-04-15 PROCEDURE — 99213 OFFICE O/P EST LOW 20 MIN: CPT | Performed by: FAMILY MEDICINE

## 2024-04-15 ASSESSMENT — PATIENT HEALTH QUESTIONNAIRE - PHQ9
10. IF YOU CHECKED OFF ANY PROBLEMS, HOW DIFFICULT HAVE THESE PROBLEMS MADE IT FOR YOU TO DO YOUR WORK, TAKE CARE OF THINGS AT HOME, OR GET ALONG WITH OTHER PEOPLE: NOT DIFFICULT AT ALL
SUM OF ALL RESPONSES TO PHQ9 QUESTIONS 1 & 2: 0
1. LITTLE INTEREST OR PLEASURE IN DOING THINGS: NOT AT ALL
5. POOR APPETITE OR OVEREATING: NOT AT ALL
3. TROUBLE FALLING OR STAYING ASLEEP: NEARLY EVERY DAY
2. FEELING DOWN, DEPRESSED OR HOPELESS: NOT AT ALL
SUM OF ALL RESPONSES TO PHQ QUESTIONS 1-9: 5
8. MOVING OR SPEAKING SO SLOWLY THAT OTHER PEOPLE COULD HAVE NOTICED. OR THE OPPOSITE, BEING SO FIGETY OR RESTLESS THAT YOU HAVE BEEN MOVING AROUND A LOT MORE THAN USUAL: NOT AT ALL
4. FEELING TIRED OR HAVING LITTLE ENERGY: MORE THAN HALF THE DAYS
7. TROUBLE CONCENTRATING ON THINGS, SUCH AS READING THE NEWSPAPER OR WATCHING TELEVISION: NOT AT ALL
6. FEELING BAD ABOUT YOURSELF - OR THAT YOU ARE A FAILURE OR HAVE LET YOURSELF OR YOUR FAMILY DOWN: NOT AT ALL
SUM OF ALL RESPONSES TO PHQ QUESTIONS 1-9: 5
9. THOUGHTS THAT YOU WOULD BE BETTER OFF DEAD, OR OF HURTING YOURSELF: NOT AT ALL

## 2024-04-15 NOTE — PROGRESS NOTES
Bre Kwan is a 38 y.o. female complains of   Chief Complaint   Patient presents with    iron deficiency; vitamin d deficiency    Fatigue    Anxiety    Depression       HPI: Here for follow-up.  Reviewed lab results.  Iron deficiency.  Mild improvement.  Discussed continuing diet and lifestyle modification.  Also on iron supplement.  Denies any constipation but sometimes straining.  Does have history of hemorrhoids.  On and off blood in the stool.  Average once a week.  Sending to colorectal specialist.  Denies any rectal pain.  No nausea vomiting or abdominal pain.  No weight or appetite changes.  Vitamin D fairly stable.  Advised to take 1000 to 2000 units daily  She will cut back from 5000 units daily  High BMI.  Discussed importance of exercise, diet modification.  She will work on it to lose weight.  And anxiety.  Last visit increase BuSpar dose.  Helping.  No panic attack but still on and off anxiousness.  Has baby in the house and dog.  Discussed to motivate to walk.  Healthy diet habits.  She will work on it.  Does not want a counselor at this time.  Will observe.  Sitting without any acute distress  Denies any headache, dizziness, no chest pain or trouble breathing, no arm or leg weakness. No nausea or vomiting, no weight or appetite changes, no mood changes . No urine or bowel complains, no palpitation, no diaphoresis. No abdominal pain. No cold or cough. No leg swelling. No fever. No sleep trouble.   CMP:  Lab Results   Component Value Date/Time     04/10/2024 09:27 AM    K 4.5 04/10/2024 09:27 AM     04/10/2024 09:27 AM    CO2 30 04/10/2024 09:27 AM    BUN 14 04/10/2024 09:27 AM    CREATININE 0.73 04/10/2024 09:27 AM    GLUCOSE 109 04/10/2024 09:27 AM    CALCIUM 9.2 04/10/2024 09:27 AM    PROT 7.2 04/10/2024 09:27 AM    LABALBU 3.6 04/10/2024 09:27 AM    BILITOT 0.4 04/10/2024 09:27 AM    AST 11 04/10/2024 09:27 AM    ALT 17 04/10/2024 09:27 AM          CBC:  Lab Results   Component

## 2024-04-15 NOTE — PROGRESS NOTES
\"Have you been to the ER, urgent care clinic since your last visit?  Hospitalized since your last visit?\"    NO    “Have you seen or consulted any other health care providers outside of [facility] since your last visit?”    NO    Chief Complaint  Patient presents with   iron deficiency; vitamin d deficiency   Fatigue   Anxiety   Depression            Click Here for Release of Records Request

## 2024-04-17 DIAGNOSIS — F41.8 DEPRESSION WITH ANXIETY: ICD-10-CM

## 2024-04-17 DIAGNOSIS — E61.1 IRON DEFICIENCY: ICD-10-CM

## 2024-04-18 DIAGNOSIS — F41.8 DEPRESSION WITH ANXIETY: ICD-10-CM

## 2024-04-18 DIAGNOSIS — E61.1 IRON DEFICIENCY: ICD-10-CM

## 2024-04-18 RX ORDER — FERROUS SULFATE 325(65) MG
1 TABLET ORAL DAILY
Qty: 90 TABLET | Refills: 0 | Status: SHIPPED | OUTPATIENT
Start: 2024-04-18 | End: 2024-04-18 | Stop reason: SDUPTHER

## 2024-04-18 RX ORDER — BUSPIRONE HYDROCHLORIDE 10 MG/1
10 TABLET ORAL 2 TIMES DAILY
Qty: 180 TABLET | Refills: 1 | Status: SHIPPED | OUTPATIENT
Start: 2024-04-18 | End: 2024-04-18 | Stop reason: SDUPTHER

## 2024-04-19 RX ORDER — FERROUS SULFATE 325(65) MG
1 TABLET ORAL DAILY
Qty: 90 TABLET | Refills: 0 | Status: SHIPPED | OUTPATIENT
Start: 2024-04-19

## 2024-04-19 RX ORDER — BUSPIRONE HYDROCHLORIDE 10 MG/1
10 TABLET ORAL 2 TIMES DAILY
Qty: 180 TABLET | Refills: 1 | Status: SHIPPED | OUTPATIENT
Start: 2024-04-19

## 2024-05-20 ENCOUNTER — OFFICE VISIT (OUTPATIENT)
Age: 39
End: 2024-05-20
Payer: OTHER GOVERNMENT

## 2024-05-20 VITALS
SYSTOLIC BLOOD PRESSURE: 104 MMHG | HEIGHT: 65 IN | OXYGEN SATURATION: 97 % | WEIGHT: 201 LBS | BODY MASS INDEX: 33.49 KG/M2 | RESPIRATION RATE: 16 BRPM | TEMPERATURE: 97.8 F | DIASTOLIC BLOOD PRESSURE: 62 MMHG | HEART RATE: 83 BPM

## 2024-05-20 DIAGNOSIS — K64.0 GRADE I HEMORRHOIDS: Primary | ICD-10-CM

## 2024-05-20 DIAGNOSIS — K62.5 RECTAL BLEEDING: ICD-10-CM

## 2024-05-20 PROCEDURE — 99203 OFFICE O/P NEW LOW 30 MIN: CPT | Performed by: COLON & RECTAL SURGERY

## 2024-05-20 NOTE — PROGRESS NOTES
HPI: Bre Kwan is a 38 y.o. female presenting with chief complain of hemorrhoids.  They have been present since the birth of her second child.  She feels protrusion and has significant bleeding.  She moves her bowels every other day.  She has occasional constipation.  She is not on a bowel regimen.  She denies fecal incontinence.  Her father had had polyps.  Her mother had surgery for diverticulitis.  She has never had a colonoscopy.    Past Medical History:   Diagnosis Date    Genital herpes     Gestational diabetes     Ill-defined condition     Syncope, PVC's    Vasovagal syncope     worked up by cardiology (Royce)       Past Surgical History:   Procedure Laterality Date     SECTION         Family History   Problem Relation Age of Onset    High Cholesterol Mother     Osteoporosis Mother     Other Mother         TIA    Gout Mother     Arthritis Mother     High Blood Pressure Mother     Gout Father     Cancer Father         Skin Cancer ([place])    Arthritis Father     Colon Polyps Father     Migraines Brother     Heart Attack Maternal Grandmother     Pacemaker Maternal Grandmother     Osteoporosis Maternal Grandmother     Eczema Daughter        Social History     Socioeconomic History    Marital status:      Spouse name: None    Number of children: None    Years of education: None    Highest education level: None   Tobacco Use    Smoking status: Former     Current packs/day: 0.00     Average packs/day: 1 pack/day for 10.0 years (10.0 ttl pk-yrs)     Types: Cigarettes     Quit date: 2016     Years since quittin.3    Smokeless tobacco: Never   Substance and Sexual Activity    Alcohol use: Not Currently    Drug use: No    Sexual activity: Not Currently     Partners: Male     Social Determinants of Health     Financial Resource Strain: Medium Risk (2023)    Overall Financial Resource Strain (CARDIA)     Difficulty of Paying Living Expenses: Somewhat hard   Transportation

## 2024-07-17 ENCOUNTER — PREP FOR PROCEDURE (OUTPATIENT)
Age: 39
End: 2024-07-17

## 2024-07-17 DIAGNOSIS — K62.5 RECTAL BLEEDING: ICD-10-CM

## 2024-08-09 ENCOUNTER — TELEPHONE (OUTPATIENT)
Age: 39
End: 2024-08-09

## 2024-08-09 NOTE — TELEPHONE ENCOUNTER
S/W pt and confirmed colonoscopy date and arrival time of 7:30 AM. Pt had no questions regarding prep.

## 2024-08-12 ENCOUNTER — TELEPHONE (OUTPATIENT)
Age: 39
End: 2024-08-12

## 2024-08-12 NOTE — TELEPHONE ENCOUNTER
Dr. Elaine advised pt on 08/11/24 that the procedure was postponed because he is sick.    I advised the pt that I am working on getting a couple of days in September so that she can be rescheduled.     Pt verbalized understanding.

## 2024-08-27 ENCOUNTER — PREP FOR PROCEDURE (OUTPATIENT)
Age: 39
End: 2024-08-27

## 2024-09-12 ENCOUNTER — ANESTHESIA EVENT (OUTPATIENT)
Facility: HOSPITAL | Age: 39
End: 2024-09-12
Payer: OTHER GOVERNMENT

## 2024-09-12 ENCOUNTER — TELEPHONE (OUTPATIENT)
Age: 39
End: 2024-09-12

## 2024-09-12 NOTE — TELEPHONE ENCOUNTER
Confirmed colonoscopy on 09/13 with arrival time of 8:15 AM. Pt has prep instructions and no questions at this time.

## 2024-09-13 ENCOUNTER — HOSPITAL ENCOUNTER (OUTPATIENT)
Facility: HOSPITAL | Age: 39
Setting detail: OUTPATIENT SURGERY
Discharge: HOME | End: 2024-09-13
Attending: COLON & RECTAL SURGERY | Admitting: COLON & RECTAL SURGERY
Payer: OTHER GOVERNMENT

## 2024-09-13 ENCOUNTER — ANESTHESIA (OUTPATIENT)
Facility: HOSPITAL | Age: 39
End: 2024-09-13
Payer: OTHER GOVERNMENT

## 2024-09-13 VITALS
WEIGHT: 201 LBS | TEMPERATURE: 98.1 F | SYSTOLIC BLOOD PRESSURE: 104 MMHG | DIASTOLIC BLOOD PRESSURE: 65 MMHG | RESPIRATION RATE: 15 BRPM | HEIGHT: 65 IN | OXYGEN SATURATION: 100 % | HEART RATE: 75 BPM | BODY MASS INDEX: 33.49 KG/M2

## 2024-09-13 LAB — HCG UR QL: NEGATIVE

## 2024-09-13 PROCEDURE — 3600007512: Performed by: COLON & RECTAL SURGERY

## 2024-09-13 PROCEDURE — 6360000002 HC RX W HCPCS: Performed by: NURSE ANESTHETIST, CERTIFIED REGISTERED

## 2024-09-13 PROCEDURE — 2580000003 HC RX 258: Performed by: NURSE ANESTHETIST, CERTIFIED REGISTERED

## 2024-09-13 PROCEDURE — 3600007502: Performed by: COLON & RECTAL SURGERY

## 2024-09-13 PROCEDURE — 88305 TISSUE EXAM BY PATHOLOGIST: CPT

## 2024-09-13 PROCEDURE — 7100000000 HC PACU RECOVERY - FIRST 15 MIN: Performed by: COLON & RECTAL SURGERY

## 2024-09-13 PROCEDURE — 45385 COLONOSCOPY W/LESION REMOVAL: CPT | Performed by: COLON & RECTAL SURGERY

## 2024-09-13 PROCEDURE — 3700000001 HC ADD 15 MINUTES (ANESTHESIA): Performed by: COLON & RECTAL SURGERY

## 2024-09-13 PROCEDURE — 2709999900 HC NON-CHARGEABLE SUPPLY: Performed by: COLON & RECTAL SURGERY

## 2024-09-13 PROCEDURE — 3700000000 HC ANESTHESIA ATTENDED CARE: Performed by: COLON & RECTAL SURGERY

## 2024-09-13 PROCEDURE — 2500000003 HC RX 250 WO HCPCS: Performed by: NURSE ANESTHETIST, CERTIFIED REGISTERED

## 2024-09-13 PROCEDURE — 81025 URINE PREGNANCY TEST: CPT

## 2024-09-13 PROCEDURE — 7100000010 HC PHASE II RECOVERY - FIRST 15 MIN: Performed by: COLON & RECTAL SURGERY

## 2024-09-13 PROCEDURE — 45380 COLONOSCOPY AND BIOPSY: CPT | Performed by: COLON & RECTAL SURGERY

## 2024-09-13 RX ORDER — SODIUM CHLORIDE, SODIUM LACTATE, POTASSIUM CHLORIDE, CALCIUM CHLORIDE 600; 310; 30; 20 MG/100ML; MG/100ML; MG/100ML; MG/100ML
INJECTION, SOLUTION INTRAVENOUS CONTINUOUS
Status: DISCONTINUED | OUTPATIENT
Start: 2024-09-13 | End: 2024-09-13 | Stop reason: HOSPADM

## 2024-09-13 RX ORDER — LIDOCAINE HYDROCHLORIDE 10 MG/ML
1 INJECTION, SOLUTION EPIDURAL; INFILTRATION; INTRACAUDAL; PERINEURAL
Status: DISCONTINUED | OUTPATIENT
Start: 2024-09-13 | End: 2024-09-13 | Stop reason: HOSPADM

## 2024-09-13 RX ORDER — FAMOTIDINE 20 MG/1
20 TABLET, FILM COATED ORAL ONCE
Status: DISCONTINUED | OUTPATIENT
Start: 2024-09-13 | End: 2024-09-13 | Stop reason: HOSPADM

## 2024-09-13 RX ORDER — PROPOFOL 10 MG/ML
INJECTION, EMULSION INTRAVENOUS
Status: DISCONTINUED | OUTPATIENT
Start: 2024-09-13 | End: 2024-09-13 | Stop reason: SDUPTHER

## 2024-09-13 RX ORDER — LIDOCAINE HYDROCHLORIDE 20 MG/ML
INJECTION, SOLUTION EPIDURAL; INFILTRATION; INTRACAUDAL; PERINEURAL
Status: DISCONTINUED | OUTPATIENT
Start: 2024-09-13 | End: 2024-09-13 | Stop reason: SDUPTHER

## 2024-09-13 RX ADMIN — PROPOFOL 50 MG: 10 INJECTION, EMULSION INTRAVENOUS at 10:24

## 2024-09-13 RX ADMIN — LIDOCAINE HYDROCHLORIDE 20 MG: 20 INJECTION, SOLUTION EPIDURAL; INFILTRATION; INTRACAUDAL; PERINEURAL at 09:56

## 2024-09-13 RX ADMIN — PROPOFOL 100 MG: 10 INJECTION, EMULSION INTRAVENOUS at 10:19

## 2024-09-13 RX ADMIN — PROPOFOL 200 MG: 10 INJECTION, EMULSION INTRAVENOUS at 10:14

## 2024-09-13 RX ADMIN — PROPOFOL 170 MG: 10 INJECTION, EMULSION INTRAVENOUS at 09:56

## 2024-09-13 RX ADMIN — PROPOFOL 130 MG: 10 INJECTION, EMULSION INTRAVENOUS at 10:03

## 2024-09-13 RX ADMIN — SODIUM CHLORIDE, SODIUM LACTATE, POTASSIUM CHLORIDE, AND CALCIUM CHLORIDE: 600; 310; 30; 20 INJECTION, SOLUTION INTRAVENOUS at 09:47

## 2024-09-13 ASSESSMENT — PAIN - FUNCTIONAL ASSESSMENT
PAIN_FUNCTIONAL_ASSESSMENT: 0-10
PAIN_FUNCTIONAL_ASSESSMENT: NONE - DENIES PAIN

## 2024-09-19 ENCOUNTER — TELEPHONE (OUTPATIENT)
Age: 39
End: 2024-09-19

## 2024-09-20 ENCOUNTER — TELEPHONE (OUTPATIENT)
Age: 39
End: 2024-09-20

## 2024-11-04 ENCOUNTER — HOSPITAL ENCOUNTER (OUTPATIENT)
Facility: HOSPITAL | Age: 39
Discharge: HOME | End: 2024-11-07
Payer: OTHER GOVERNMENT

## 2024-11-04 DIAGNOSIS — E61.1 IRON DEFICIENCY: ICD-10-CM

## 2024-11-04 DIAGNOSIS — E55.9 VITAMIN D DEFICIENCY: ICD-10-CM

## 2024-11-04 DIAGNOSIS — R53.82 CHRONIC FATIGUE: ICD-10-CM

## 2024-11-04 DIAGNOSIS — F41.8 DEPRESSION WITH ANXIETY: ICD-10-CM

## 2024-11-04 DIAGNOSIS — F41.9 ANXIETY: ICD-10-CM

## 2024-11-04 LAB
25(OH)D3 SERPL-MCNC: 37.3 NG/ML (ref 30–100)
ALBUMIN SERPL-MCNC: 3.7 G/DL (ref 3.4–5)
ALBUMIN/GLOB SERPL: 1.1 (ref 0.8–1.7)
ALP SERPL-CCNC: 64 U/L (ref 45–117)
ALT SERPL-CCNC: 16 U/L (ref 13–56)
ANION GAP SERPL CALC-SCNC: 5 MMOL/L (ref 3–18)
AST SERPL-CCNC: 11 U/L (ref 10–38)
BILIRUB SERPL-MCNC: 0.6 MG/DL (ref 0.2–1)
BUN SERPL-MCNC: 14 MG/DL (ref 7–18)
BUN/CREAT SERPL: 22 (ref 12–20)
CALCIUM SERPL-MCNC: 9.6 MG/DL (ref 8.5–10.1)
CHLORIDE SERPL-SCNC: 106 MMOL/L (ref 100–111)
CO2 SERPL-SCNC: 28 MMOL/L (ref 21–32)
CREAT SERPL-MCNC: 0.64 MG/DL (ref 0.6–1.3)
GLOBULIN SER CALC-MCNC: 3.5 G/DL (ref 2–4)
GLUCOSE SERPL-MCNC: 101 MG/DL (ref 74–99)
IRON SATN MFR SERPL: 22 % (ref 20–50)
IRON SERPL-MCNC: 57 UG/DL (ref 50–175)
POTASSIUM SERPL-SCNC: 4.8 MMOL/L (ref 3.5–5.5)
PROT SERPL-MCNC: 7.2 G/DL (ref 6.4–8.2)
SODIUM SERPL-SCNC: 139 MMOL/L (ref 136–145)
TIBC SERPL-MCNC: 262 UG/DL (ref 250–450)

## 2024-11-04 PROCEDURE — 83540 ASSAY OF IRON: CPT

## 2024-11-04 PROCEDURE — 36415 COLL VENOUS BLD VENIPUNCTURE: CPT

## 2024-11-04 PROCEDURE — 82306 VITAMIN D 25 HYDROXY: CPT

## 2024-11-04 PROCEDURE — 80053 COMPREHEN METABOLIC PANEL: CPT

## 2024-11-04 PROCEDURE — 83550 IRON BINDING TEST: CPT

## 2024-11-11 ENCOUNTER — OFFICE VISIT (OUTPATIENT)
Facility: CLINIC | Age: 39
End: 2024-11-11

## 2024-11-11 VITALS
WEIGHT: 206.4 LBS | OXYGEN SATURATION: 98 % | HEIGHT: 66 IN | BODY MASS INDEX: 33.17 KG/M2 | SYSTOLIC BLOOD PRESSURE: 106 MMHG | HEART RATE: 73 BPM | TEMPERATURE: 97.4 F | DIASTOLIC BLOOD PRESSURE: 62 MMHG | RESPIRATION RATE: 16 BRPM

## 2024-11-11 DIAGNOSIS — Z00.00 ENCOUNTER FOR WELL ADULT EXAM WITHOUT ABNORMAL FINDINGS: Primary | ICD-10-CM

## 2024-11-11 DIAGNOSIS — E55.9 VITAMIN D DEFICIENCY: ICD-10-CM

## 2024-11-11 DIAGNOSIS — Z13.1 SCREENING FOR DIABETES MELLITUS: ICD-10-CM

## 2024-11-11 DIAGNOSIS — F41.9 ANXIETY: ICD-10-CM

## 2024-11-11 DIAGNOSIS — Z12.31 ENCOUNTER FOR SCREENING MAMMOGRAM FOR MALIGNANT NEOPLASM OF BREAST: ICD-10-CM

## 2024-11-11 DIAGNOSIS — Z13.220 SCREENING FOR HYPERLIPIDEMIA: ICD-10-CM

## 2024-11-11 DIAGNOSIS — E61.1 IRON DEFICIENCY: ICD-10-CM

## 2024-11-11 DIAGNOSIS — Z86.19 H/O HERPES GENITALIS: ICD-10-CM

## 2024-11-11 RX ORDER — ELECTROLYTES/DEXTROSE
SOLUTION, ORAL ORAL
COMMUNITY

## 2024-11-11 RX ORDER — BUSPIRONE HYDROCHLORIDE 15 MG/1
15 TABLET ORAL 2 TIMES DAILY
Qty: 180 TABLET | Refills: 0 | Status: SHIPPED | OUTPATIENT
Start: 2024-11-11

## 2024-11-11 SDOH — ECONOMIC STABILITY: FOOD INSECURITY: WITHIN THE PAST 12 MONTHS, YOU WORRIED THAT YOUR FOOD WOULD RUN OUT BEFORE YOU GOT MONEY TO BUY MORE.: NEVER TRUE

## 2024-11-11 SDOH — ECONOMIC STABILITY: FOOD INSECURITY: WITHIN THE PAST 12 MONTHS, THE FOOD YOU BOUGHT JUST DIDN'T LAST AND YOU DIDN'T HAVE MONEY TO GET MORE.: NEVER TRUE

## 2024-11-11 SDOH — ECONOMIC STABILITY: INCOME INSECURITY: HOW HARD IS IT FOR YOU TO PAY FOR THE VERY BASICS LIKE FOOD, HOUSING, MEDICAL CARE, AND HEATING?: SOMEWHAT HARD

## 2024-11-11 NOTE — PROGRESS NOTES
Well Adult Note  Name: Bre Kwan Today’s Date: 2024   MRN: [id number] Sex: Female   Age: 38 y.o. Ethnicity: Non- / Non    : 1985 Race: White (non-)      Bre Kwan is here for a well adult exam.          Assessment & Plan      Encounter for well adult exam without abnormal findings  Results         No follow-ups on file.     Subjective   History of Present Illness        Review of Systems       No Known Allergies  Prior to Visit Medications    Medication Sig Taking? Authorizing Provider   Multiple Vitamin (MULTIVITAMIN ADULT) TABS Take by mouth Yes Heather Hunter MD   busPIRone (BUSPAR) 10 MG tablet Take 1 tablet by mouth 2 times daily Yes Abril De Leon MD   ferrous sulfate (FEROSUL) 325 (65 Fe) MG tablet Take 1 tablet by mouth daily Yes Abril DeL eon MD   valACYclovir (VALTREX) 500 MG tablet  Yes Heather Hunter MD   vitamin D (CHOLECALCIFEROL) 25 MCG (1000 UT) TABS tablet Take by mouth daily Yes Automatic Reconciliation, Ar   Prenatal Vit-Fe Fumarate-FA (PRENATAL VITAMIN PO) Take by mouth  Patient not taking: Reported on 2024  ProviderHeather MD     Past Medical History:   Diagnosis Date   • Genital herpes    • Gestational diabetes    • Ill-defined condition     Syncope, PVC's   • Vasovagal syncope     worked up by cardiology (Royce)     Past Surgical History:   Procedure Laterality Date   •  SECTION  2017   • COLONOSCOPY N/A 2024    COLONOSCOPY DIAGNOSTIC with polypectomy and bx's performed by Osito lEaine MD at [facility] ENDOSCOPY     Family History   Problem Relation Age of Onset   • High Cholesterol Mother    • Osteoporosis Mother    • Other Mother         TIA   • Gout Mother    • Arthritis Mother    • High Blood Pressure Mother    • Gout Father    • Cancer Father         Skin Cancer ([place])   • Arthritis Father    • Colon Polyps Father    • Migraines Brother    • Heart Attack Maternal Grandmother    • Pacemaker

## 2024-11-11 NOTE — PROGRESS NOTES
\"Have you been to the ER, urgent care clinic since your last visit?  Hospitalized since your last visit?\"    [facility] LOV: 9/13/24 for colonoscopy - Dr. Elaine.    “Have you seen or consulted any other health care providers outside our system since your last visit?”    OB/GYN, Dr. Jemima Angel LOV: 6/26/24.    Last pap smear - 6/26/24    Last mammogram - 1/31/24    Chief Complaint   Patient presents with    Annual Exam

## 2024-11-11 NOTE — PROGRESS NOTES
Bre Kwan (:  1985) is a 38 y.o. female, Established patient, here for evaluation of the following chief complaint(s):  Annual Exam         Assessment & Plan  1. High BMI.  She has been unable to lose weight since having her son. Recommendations include maintaining a food diary, reducing portion sizes, avoiding fried, high-calorie, and high-carb foods, and increasing protein intake. She was advised to avoid sodas and sweet drinks and to consider intermittent fasting (12, 14, or 16 hours). Weight loss pills and injections were discussed, but lifestyle changes were emphasized due to potential side effects. She was also advised to avoid buying unhealthy groceries.    2. Sleep disturbance.  Her sleep issues are situational, primarily due to her son sleeping in her bed and staying up late to complete tasks. She was advised to maintain consistent sleep hygiene, including regular sleep times, avoiding eating or watching TV in bed, refraining from late-night snacking or exercise, and not checking the time upon waking during the night.    3. Anxiety.  She reports that her anxiety comes and goes and is unsure if BuSpar 10 mg twice daily is effective. She was advised to increase her BuSpar dosage to 15 mg twice daily. Counseling was suggested but not yet pursued.    4. Iron deficiency.  Her iron levels have improved. She was advised to continue her iron-rich diet and iron supplements.    5. Health Maintenance.  Her mammogram in 2024 was negative, and her Pap smear in 2024 was normal. A 3D mammogram was ordered for 2025. Fasting blood work was ordered to screen for diabetes, cholesterol, liver and kidney function, thyroid function, and blood count. She was advised to get the COVID-19 booster if she has not taken it in the last 6 months.    6. Well adult exam:     Pt understood and agree with the plan     Follow up in 4 months.       Results  Laboratory Studies  Iron level has improved

## 2024-12-19 ENCOUNTER — OFFICE VISIT (OUTPATIENT)
Facility: CLINIC | Age: 39
End: 2024-12-19
Payer: OTHER GOVERNMENT

## 2024-12-19 VITALS
TEMPERATURE: 97.8 F | SYSTOLIC BLOOD PRESSURE: 130 MMHG | RESPIRATION RATE: 16 BRPM | BODY MASS INDEX: 33.43 KG/M2 | WEIGHT: 208 LBS | DIASTOLIC BLOOD PRESSURE: 70 MMHG | HEART RATE: 67 BPM | OXYGEN SATURATION: 97 % | HEIGHT: 66 IN

## 2024-12-19 DIAGNOSIS — R05.1 ACUTE COUGH: Primary | ICD-10-CM

## 2024-12-19 PROCEDURE — 99213 OFFICE O/P EST LOW 20 MIN: CPT | Performed by: FAMILY MEDICINE

## 2024-12-19 RX ORDER — DOXYCYCLINE 100 MG/1
CAPSULE ORAL
COMMUNITY
Start: 2024-05-12

## 2024-12-19 RX ORDER — ALBUTEROL SULFATE 90 UG/1
2 INHALANT RESPIRATORY (INHALATION) 4 TIMES DAILY PRN
Qty: 18 G | Refills: 0 | Status: SHIPPED | OUTPATIENT
Start: 2024-12-19

## 2024-12-19 NOTE — PROGRESS NOTES
Bre Kwan (:  1985) is a 38 y.o. female, Established patient, here for evaluation of the following chief complaint(s):  Cough (- feeling more congested), Pneumonia ([facility] on [address] - was diagnosed with walking pneumonia. Chest xray done at visit. ), and Breathing Problem (Feels like she has to take a lot of deep breaths)         Assessment & Plan  1. Walking pneumonia.  URI symptoms  Her vital signs, including blood pressure, heart rate, and oxygen saturation, are within normal limits. She is able to articulate complete sentences without the need for additional respiratory muscle engagement. The possibility of post-viral or post-bronchitis changes can not be ruled out at this stage. Auscultation reveals adequate air entry into the lungs, with no evidence of wheezing. Cardiac examination indicates a regular rhythm. It is plausible that she is currently in the recovery phase, with residual bronchitis still present. The cough may persist for some time before complete resolution. A chest x-ray will be ordered in 4 weeks to ensure complete resolution of the pneumonia. An albuterol inhaler will be provided to facilitate lung expansion, improve breathing, and reduce inflammation. Symptomatic treatment will be initiated, including Tylenol or Advil with food for pain, aches, and fever, salt water gargle for sore throat, and albuterol for chest tightness. If necessary, Zyrtec can be taken at night for postnasal drip. If there is no improvement in her condition within the next few weeks, she is advised to inform us so that oral steroids can be considered to alleviate chest tightness.  She has completed doxycycline and no side effects with medication.  Will try to [facility]    Patient understood and agreed with the plan  Follow-up  The patient will follow up in 1 month, or earlier if necessary.    Results  Imaging  Chest x-ray showed walking pneumonia.  1.

## 2024-12-19 NOTE — PROGRESS NOTES
\"Have you been to the ER, urgent care clinic since your last visit?  Hospitalized since your last visit?\"    [facility] [address] LOV: 12/01/24 for walking pneumonia.    “Have you seen or consulted any other health care providers outside our system since your last visit?”    NO    Chief Complaint   Patient presents with    Cough     - feeling more congested    Pneumonia     [facility] on 12/01/[address] - was diagnosed with walking pneumonia. Chest xray done at visit.     Breathing Problem     Feels like she has to take a lot of deep breaths

## 2024-12-20 DIAGNOSIS — E61.1 IRON DEFICIENCY: ICD-10-CM

## 2024-12-23 RX ORDER — FERROUS SULFATE 325(65) MG
1 TABLET ORAL DAILY
Qty: 90 TABLET | Refills: 0 | Status: SHIPPED | OUTPATIENT
Start: 2024-12-23 | End: 2024-12-29 | Stop reason: SDUPTHER

## 2024-12-29 DIAGNOSIS — E61.1 IRON DEFICIENCY: ICD-10-CM

## 2025-01-02 RX ORDER — FERROUS SULFATE 325(65) MG
1 TABLET ORAL DAILY
Qty: 90 TABLET | Refills: 0 | Status: SHIPPED | OUTPATIENT
Start: 2025-01-02

## 2025-01-13 DIAGNOSIS — F41.9 ANXIETY: ICD-10-CM

## 2025-01-14 ENCOUNTER — HOSPITAL ENCOUNTER (OUTPATIENT)
Facility: HOSPITAL | Age: 40
Discharge: HOME | End: 2025-01-17
Payer: OTHER GOVERNMENT

## 2025-01-14 DIAGNOSIS — R05.1 ACUTE COUGH: ICD-10-CM

## 2025-01-14 PROCEDURE — 71046 X-RAY EXAM CHEST 2 VIEWS: CPT

## 2025-01-14 RX ORDER — BUSPIRONE HYDROCHLORIDE 15 MG/1
15 TABLET ORAL 2 TIMES DAILY
Qty: 180 TABLET | Refills: 1 | Status: SHIPPED | OUTPATIENT
Start: 2025-01-14

## 2025-01-14 NOTE — TELEPHONE ENCOUNTER
This patient contacted the office for the following prescriptions to be refilled:    Medication requested :   Requested Prescriptions     Pending Prescriptions Disp Refills    busPIRone (BUSPAR) 15 MG tablet 180 tablet 0     Sig: Take 15 mg by mouth 2 times daily        Last Refilled: 11/11/2024    Last Office Visit: 12/19/2024    Next Office Visit: 1/21/2025    Last Labs:11/4/2024

## 2025-02-03 ENCOUNTER — HOSPITAL ENCOUNTER (OUTPATIENT)
Facility: HOSPITAL | Age: 40
Discharge: HOME | End: 2025-02-06
Attending: FAMILY MEDICINE
Payer: OTHER GOVERNMENT

## 2025-02-03 VITALS — BODY MASS INDEX: 33.99 KG/M2 | WEIGHT: 204 LBS | HEIGHT: 65 IN

## 2025-02-03 DIAGNOSIS — Z12.31 ENCOUNTER FOR SCREENING MAMMOGRAM FOR MALIGNANT NEOPLASM OF BREAST: ICD-10-CM

## 2025-02-03 PROCEDURE — 77063 BREAST TOMOSYNTHESIS BI: CPT

## 2025-03-04 ENCOUNTER — HOSPITAL ENCOUNTER (OUTPATIENT)
Facility: HOSPITAL | Age: 40
Discharge: HOME | End: 2025-03-07
Payer: OTHER GOVERNMENT

## 2025-03-04 DIAGNOSIS — F41.9 ANXIETY: ICD-10-CM

## 2025-03-04 DIAGNOSIS — E61.1 IRON DEFICIENCY: ICD-10-CM

## 2025-03-04 DIAGNOSIS — Z13.220 SCREENING FOR HYPERLIPIDEMIA: ICD-10-CM

## 2025-03-04 DIAGNOSIS — E55.9 VITAMIN D DEFICIENCY: ICD-10-CM

## 2025-03-04 DIAGNOSIS — Z13.1 SCREENING FOR DIABETES MELLITUS: ICD-10-CM

## 2025-03-04 LAB
ALBUMIN SERPL-MCNC: 3.5 G/DL (ref 3.4–5)
ALBUMIN/GLOB SERPL: 0.9 (ref 0.8–1.7)
ALP SERPL-CCNC: 64 U/L (ref 45–117)
ALT SERPL-CCNC: 17 U/L (ref 13–56)
ANION GAP SERPL CALC-SCNC: 7 MMOL/L (ref 3–18)
AST SERPL-CCNC: 15 U/L (ref 10–38)
BILIRUB SERPL-MCNC: 1.6 MG/DL (ref 0.2–1)
BUN SERPL-MCNC: 13 MG/DL (ref 7–18)
BUN/CREAT SERPL: 18 (ref 12–20)
CALCIUM SERPL-MCNC: 8.8 MG/DL (ref 8.5–10.1)
CHLORIDE SERPL-SCNC: 106 MMOL/L (ref 100–111)
CHOLEST SERPL-MCNC: 177 MG/DL
CO2 SERPL-SCNC: 25 MMOL/L (ref 21–32)
CREAT SERPL-MCNC: 0.71 MG/DL (ref 0.6–1.3)
ERYTHROCYTE [DISTWIDTH] IN BLOOD BY AUTOMATED COUNT: 13 % (ref 11.6–14.5)
EST. AVERAGE GLUCOSE BLD GHB EST-MCNC: 91 MG/DL
GLOBULIN SER CALC-MCNC: 3.8 G/DL (ref 2–4)
GLUCOSE SERPL-MCNC: 102 MG/DL (ref 74–99)
HBA1C MFR BLD: 4.8 % (ref 4.2–5.6)
HCT VFR BLD AUTO: 39 % (ref 35–45)
HDLC SERPL-MCNC: 46 MG/DL (ref 40–60)
HDLC SERPL: 3.8 (ref 0–5)
HGB BLD-MCNC: 12.4 G/DL (ref 12–16)
LDLC SERPL CALC-MCNC: 107.8 MG/DL (ref 0–100)
LIPID PANEL: ABNORMAL
MCH RBC QN AUTO: 28.5 PG (ref 24–34)
MCHC RBC AUTO-ENTMCNC: 31.8 G/DL (ref 31–37)
MCV RBC AUTO: 89.7 FL (ref 78–100)
NRBC # BLD: 0 K/UL (ref 0–0.01)
NRBC BLD-RTO: 0 PER 100 WBC
PLATELET # BLD AUTO: 329 K/UL (ref 135–420)
PMV BLD AUTO: 9.6 FL (ref 9.2–11.8)
POTASSIUM SERPL-SCNC: 4.6 MMOL/L (ref 3.5–5.5)
PROT SERPL-MCNC: 7.3 G/DL (ref 6.4–8.2)
RBC # BLD AUTO: 4.35 M/UL (ref 4.2–5.3)
SODIUM SERPL-SCNC: 138 MMOL/L (ref 136–145)
TRIGL SERPL-MCNC: 116 MG/DL
TSH SERPL DL<=0.05 MIU/L-ACNC: 1.97 UIU/ML (ref 0.36–3.74)
VLDLC SERPL CALC-MCNC: 23.2 MG/DL
WBC # BLD AUTO: 8.1 K/UL (ref 4.6–13.2)

## 2025-03-04 PROCEDURE — 80061 LIPID PANEL: CPT

## 2025-03-04 PROCEDURE — 84443 ASSAY THYROID STIM HORMONE: CPT

## 2025-03-04 PROCEDURE — 80053 COMPREHEN METABOLIC PANEL: CPT

## 2025-03-04 PROCEDURE — 36415 COLL VENOUS BLD VENIPUNCTURE: CPT

## 2025-03-04 PROCEDURE — 85027 COMPLETE CBC AUTOMATED: CPT

## 2025-03-04 PROCEDURE — 83036 HEMOGLOBIN GLYCOSYLATED A1C: CPT

## 2025-03-08 SDOH — ECONOMIC STABILITY: INCOME INSECURITY: IN THE LAST 12 MONTHS, WAS THERE A TIME WHEN YOU WERE NOT ABLE TO PAY THE MORTGAGE OR RENT ON TIME?: NO

## 2025-03-08 SDOH — ECONOMIC STABILITY: FOOD INSECURITY: WITHIN THE PAST 12 MONTHS, YOU WORRIED THAT YOUR FOOD WOULD RUN OUT BEFORE YOU GOT MONEY TO BUY MORE.: NEVER TRUE

## 2025-03-08 SDOH — ECONOMIC STABILITY: FOOD INSECURITY: WITHIN THE PAST 12 MONTHS, THE FOOD YOU BOUGHT JUST DIDN'T LAST AND YOU DIDN'T HAVE MONEY TO GET MORE.: NEVER TRUE

## 2025-03-08 SDOH — ECONOMIC STABILITY: TRANSPORTATION INSECURITY
IN THE PAST 12 MONTHS, HAS THE LACK OF TRANSPORTATION KEPT YOU FROM MEDICAL APPOINTMENTS OR FROM GETTING MEDICATIONS?: NO

## 2025-03-11 ENCOUNTER — OFFICE VISIT (OUTPATIENT)
Facility: CLINIC | Age: 40
End: 2025-03-11
Payer: OTHER GOVERNMENT

## 2025-03-11 VITALS
TEMPERATURE: 97.6 F | RESPIRATION RATE: 16 BRPM | BODY MASS INDEX: 35.13 KG/M2 | DIASTOLIC BLOOD PRESSURE: 72 MMHG | OXYGEN SATURATION: 97 % | SYSTOLIC BLOOD PRESSURE: 128 MMHG | WEIGHT: 218.6 LBS | HEART RATE: 86 BPM | HEIGHT: 66 IN

## 2025-03-11 DIAGNOSIS — Z91.89 LACK OF MOTIVATION: ICD-10-CM

## 2025-03-11 DIAGNOSIS — F32.89 OTHER DEPRESSION: Primary | ICD-10-CM

## 2025-03-11 DIAGNOSIS — E61.1 IRON DEFICIENCY: ICD-10-CM

## 2025-03-11 PROCEDURE — 99214 OFFICE O/P EST MOD 30 MIN: CPT | Performed by: FAMILY MEDICINE

## 2025-03-11 ASSESSMENT — PATIENT HEALTH QUESTIONNAIRE - PHQ9
1. LITTLE INTEREST OR PLEASURE IN DOING THINGS: SEVERAL DAYS
SUM OF ALL RESPONSES TO PHQ QUESTIONS 1-9: 6
4. FEELING TIRED OR HAVING LITTLE ENERGY: SEVERAL DAYS
SUM OF ALL RESPONSES TO PHQ QUESTIONS 1-9: 6
SUM OF ALL RESPONSES TO PHQ QUESTIONS 1-9: 6
7. TROUBLE CONCENTRATING ON THINGS, SUCH AS READING THE NEWSPAPER OR WATCHING TELEVISION: NOT AT ALL
3. TROUBLE FALLING OR STAYING ASLEEP: SEVERAL DAYS
SUM OF ALL RESPONSES TO PHQ QUESTIONS 1-9: 6
8. MOVING OR SPEAKING SO SLOWLY THAT OTHER PEOPLE COULD HAVE NOTICED. OR THE OPPOSITE, BEING SO FIGETY OR RESTLESS THAT YOU HAVE BEEN MOVING AROUND A LOT MORE THAN USUAL: NOT AT ALL
2. FEELING DOWN, DEPRESSED OR HOPELESS: SEVERAL DAYS
9. THOUGHTS THAT YOU WOULD BE BETTER OFF DEAD, OR OF HURTING YOURSELF: NOT AT ALL
5. POOR APPETITE OR OVEREATING: NOT AT ALL
6. FEELING BAD ABOUT YOURSELF - OR THAT YOU ARE A FAILURE OR HAVE LET YOURSELF OR YOUR FAMILY DOWN: MORE THAN HALF THE DAYS
10. IF YOU CHECKED OFF ANY PROBLEMS, HOW DIFFICULT HAVE THESE PROBLEMS MADE IT FOR YOU TO DO YOUR WORK, TAKE CARE OF THINGS AT HOME, OR GET ALONG WITH OTHER PEOPLE: SOMEWHAT DIFFICULT

## 2025-03-11 ASSESSMENT — ANXIETY QUESTIONNAIRES
6. BECOMING EASILY ANNOYED OR IRRITABLE: SEVERAL DAYS
GAD7 TOTAL SCORE: 4
5. BEING SO RESTLESS THAT IT IS HARD TO SIT STILL: NOT AT ALL
7. FEELING AFRAID AS IF SOMETHING AWFUL MIGHT HAPPEN: NOT AT ALL
2. NOT BEING ABLE TO STOP OR CONTROL WORRYING: SEVERAL DAYS
1. FEELING NERVOUS, ANXIOUS, OR ON EDGE: SEVERAL DAYS
3. WORRYING TOO MUCH ABOUT DIFFERENT THINGS: SEVERAL DAYS
IF YOU CHECKED OFF ANY PROBLEMS ON THIS QUESTIONNAIRE, HOW DIFFICULT HAVE THESE PROBLEMS MADE IT FOR YOU TO DO YOUR WORK, TAKE CARE OF THINGS AT HOME, OR GET ALONG WITH OTHER PEOPLE: SOMEWHAT DIFFICULT
4. TROUBLE RELAXING: NOT AT ALL

## 2025-03-11 NOTE — PROGRESS NOTES
\"Have you been to the ER, urgent care clinic since your last visit?  Hospitalized since your last visit?\"    NO    “Have you seen or consulted any other health care providers outside our system since your last visit?”    NO    Chief Complaint   Patient presents with    Sleep Problem    Anxiety    Iron deficiency              
        The patient (or guardian, if applicable) and other individuals in attendance with the patient were advised that Artificial Intelligence will be utilized during this visit to record, process the conversation to generate a clinical note and to support improvement of the AI technology. The patient (or guardian, if applicable) and other individuals in attendance at the appointment consented to the use of AI, including the recording.      An electronic signature was used to authenticate this note.    --Abril De Leon MD

## 2025-03-31 DIAGNOSIS — R17 SERUM TOTAL BILIRUBIN ELEVATED: Primary | ICD-10-CM

## 2025-04-03 ENCOUNTER — HOSPITAL ENCOUNTER (OUTPATIENT)
Facility: HOSPITAL | Age: 40
Discharge: HOME | End: 2025-04-03
Payer: OTHER GOVERNMENT

## 2025-04-03 ENCOUNTER — RESULTS FOLLOW-UP (OUTPATIENT)
Facility: CLINIC | Age: 40
End: 2025-04-03

## 2025-04-03 DIAGNOSIS — R17 SERUM TOTAL BILIRUBIN ELEVATED: ICD-10-CM

## 2025-04-03 LAB
BILIRUB DIRECT SERPL-MCNC: 0.2 MG/DL (ref 0–0.2)
BILIRUB INDIRECT SERPL-MCNC: 0.6 MG/DL
BILIRUB SERPL-MCNC: 0.8 MG/DL (ref 0.2–1)

## 2025-04-03 PROCEDURE — 36415 COLL VENOUS BLD VENIPUNCTURE: CPT

## 2025-04-03 PROCEDURE — 82247 BILIRUBIN TOTAL: CPT

## 2025-04-03 PROCEDURE — 82248 BILIRUBIN DIRECT: CPT

## 2025-06-11 ENCOUNTER — OFFICE VISIT (OUTPATIENT)
Facility: CLINIC | Age: 40
End: 2025-06-11
Payer: OTHER GOVERNMENT

## 2025-06-11 VITALS
TEMPERATURE: 97.3 F | WEIGHT: 203.4 LBS | OXYGEN SATURATION: 98 % | SYSTOLIC BLOOD PRESSURE: 106 MMHG | BODY MASS INDEX: 32.69 KG/M2 | DIASTOLIC BLOOD PRESSURE: 62 MMHG | RESPIRATION RATE: 16 BRPM | HEIGHT: 66 IN | HEART RATE: 74 BPM

## 2025-06-11 DIAGNOSIS — R00.2 PALPITATION: ICD-10-CM

## 2025-06-11 DIAGNOSIS — F32.A ANXIETY AND DEPRESSION: ICD-10-CM

## 2025-06-11 DIAGNOSIS — R17 SERUM TOTAL BILIRUBIN ELEVATED: ICD-10-CM

## 2025-06-11 DIAGNOSIS — F41.9 ANXIETY AND DEPRESSION: ICD-10-CM

## 2025-06-11 DIAGNOSIS — E61.1 IRON DEFICIENCY: Primary | ICD-10-CM

## 2025-06-11 PROCEDURE — 99214 OFFICE O/P EST MOD 30 MIN: CPT | Performed by: FAMILY MEDICINE

## 2025-06-11 RX ORDER — TRETINOIN 0.25 MG/G
CREAM TOPICAL
COMMUNITY
Start: 2025-04-11

## 2025-06-11 RX ORDER — TIRZEPATIDE 5 MG/.5ML
INJECTION, SOLUTION SUBCUTANEOUS
COMMUNITY
Start: 2025-05-13

## 2025-06-11 NOTE — PROGRESS NOTES
Have you been to the ER, urgent care clinic since your last visit?  Hospitalized since your last visit?   NO    Have you seen or consulted any other health care providers outside our system since your last visit?   [facility] LOV: 6/10/25 - with Dr. Faiza Green.    Chief Complaint   Patient presents with    Depression    Iron deficiency

## 2025-06-11 NOTE — PROGRESS NOTES
Bre Kwan (:  1985) is a 39 y.o. female, Established patient, here for evaluation of the following chief complaint(s):  Depression and Iron deficiency         Assessment & Plan  1. Anxiety.  - Reports that BuSpar 15 mg once a day is not sufficiently managing her anxiety.  - Initially noted significant improvement with BuSpar but now feels it is less effective.  - Recommended to take BuSpar 15 mg twice a day due to its short half-life.  - Agrees to start taking the same dose twice daily.    2. Depression.  - Reports improvement in depression since starting Zepbound.  - Increased physical activity and healthier diet contributing to better mental state.  - No changes to current treatment plan necessary at this time.  - Continues to feel better mentally.    3. Constipation.  - Experiences mild constipation, likely due to Zepbound.  - Occasionally takes fiber gummies to manage symptoms.  - No additional treatment required at this time.  - Advised to monitor symptoms and continue current management.    4. Palpitations.  - Reports occasional PVCs, felt more in the neck than chest.  - Advised to avoid caffeine and chocolates as potential triggers.  - If palpitations persist, worsen, or are associated with sweating, dizziness, or chest pain, further evaluation will be necessary.  - Advised to keep a diary of symptoms and their duration; magnesium supplementation can be considered.    5. Health Maintenance.  - Hemoglobin and hematocrit levels have shown significant improvement, with readings of 12.4 and 39.0 respectively in 2025.  - Platelet count was 329.  - Lipid panel remains stable, and A1c level is within the normal range, indicating good three-month glucose control.  - Thyroid function tests are within normal limits.  - Up to date with mammogram conducted in 2025.  - Advised to continue current medication regimen.  - Re-evaluation of labs will be conducted prior to the next visit, including

## 2025-07-11 ENCOUNTER — OFFICE VISIT (OUTPATIENT)
Facility: CLINIC | Age: 40
End: 2025-07-11
Payer: OTHER GOVERNMENT

## 2025-07-11 VITALS
BODY MASS INDEX: 31.02 KG/M2 | DIASTOLIC BLOOD PRESSURE: 72 MMHG | TEMPERATURE: 98.2 F | HEART RATE: 75 BPM | OXYGEN SATURATION: 95 % | HEIGHT: 66 IN | WEIGHT: 193 LBS | RESPIRATION RATE: 18 BRPM | SYSTOLIC BLOOD PRESSURE: 126 MMHG

## 2025-07-11 DIAGNOSIS — R05.1 ACUTE COUGH: Primary | ICD-10-CM

## 2025-07-11 DIAGNOSIS — R09.82 PND (POST-NASAL DRIP): ICD-10-CM

## 2025-07-11 PROCEDURE — 99214 OFFICE O/P EST MOD 30 MIN: CPT | Performed by: FAMILY MEDICINE

## 2025-07-11 RX ORDER — NORGESTIMATE AND ETHINYL ESTRADIOL 7DAYSX3 LO
KIT ORAL
COMMUNITY
Start: 2025-07-01

## 2025-07-11 RX ORDER — CETIRIZINE HYDROCHLORIDE 10 MG/1
10 TABLET ORAL DAILY
Qty: 90 TABLET | Refills: 0 | Status: SHIPPED | OUTPATIENT
Start: 2025-07-11

## 2025-07-11 ASSESSMENT — ENCOUNTER SYMPTOMS
CONSTIPATION: 0
SHORTNESS OF BREATH: 0
DIARRHEA: 0
ABDOMINAL PAIN: 0
COUGH: 1
EYE PAIN: 0

## 2025-07-11 NOTE — PROGRESS NOTES
[facility]  2025    Bre Kwan (:  1985) is a 39 y.o. female, here for evaluation of the following medical concerns:    Chief Complaint   Patient presents with    Cough    Shortness of Breath        ASSESSMENT/ PLAN  1. Acute cough  Likely secondary to PND from uncontrolled allergies. In the setting of PNA twice in the last year, will err on the side of caution and check XR.   - cetirizine (ZYRTEC) 10 MG tablet; Take 1 tablet by mouth daily  Dispense: 90 tablet; Refill: 0  - XR CHEST (2 VW); Future    2. PND (post-nasal drip)  Will trial zyrtec and follow up in 4-6 days if not improvement.        No follow-ups on file.    Future Appointments   Date Time Provider Department Center   2025 11:15 AM Abril De Leon MD [facility] DEP         HPI  Cough started 4 days ago.   Sick contact: daughter on abx.   Associated symp: PND, sore throat in AM  Denies headache, chest pain, shortness of breath, fatigue, myalgia, ear pain, sinus congestion, sneezing. No GI symptoms.   No h/o of asthma or seasonal allergies.     ROS  Review of Systems   Constitutional:  Negative for appetite change, chills, fatigue and fever.   HENT:  Negative for congestion.    Eyes:  Negative for pain and visual disturbance.   Respiratory:  Positive for cough. Negative for shortness of breath (w/ cough).    Cardiovascular:  Negative for chest pain and palpitations.   Gastrointestinal:  Negative for abdominal pain, constipation and diarrhea.   Genitourinary:  Negative for difficulty urinating.   Musculoskeletal:  Negative for arthralgias.   Skin:  Negative for rash and wound.   Neurological:  Negative for dizziness, weakness, light-headedness and headaches.   Hematological:  Does not bruise/bleed easily.   Psychiatric/Behavioral:  Negative for behavioral problems.        HISTORIES  Current Outpatient Medications on File Prior to Visit   Medication Sig Dispense Refill    TRI-LO-SHANNON 0.18/0.215/0.25

## 2025-07-19 ENCOUNTER — HOSPITAL ENCOUNTER (OUTPATIENT)
Age: 40
Discharge: HOME | End: 2025-07-22
Payer: OTHER GOVERNMENT

## 2025-07-19 DIAGNOSIS — R05.1 ACUTE COUGH: ICD-10-CM

## 2025-07-19 PROCEDURE — 71046 X-RAY EXAM CHEST 2 VIEWS: CPT

## 2025-09-02 DIAGNOSIS — E61.1 IRON DEFICIENCY: ICD-10-CM

## 2025-09-03 RX ORDER — FERROUS SULFATE 325(65) MG
1 TABLET ORAL DAILY
Qty: 90 TABLET | Refills: 0 | Status: SHIPPED | OUTPATIENT
Start: 2025-09-03

## (undated) DEVICE — SYRINGE MED 50ML LUERSLIP TIP

## (undated) DEVICE — UNDERPAD INCONT W23XL36IN STD BLU POLYPR BK FLUF SFT

## (undated) DEVICE — CANNULA ORIG TL CLR W FOAM CUSHIONS AND 14FT SUPL TB 3 CHN

## (undated) DEVICE — CATHETER SUCT TR FL TIP 14FR W/ O CTRL

## (undated) DEVICE — MEDI-VAC NON-CONDUCTIVE SUCTION TUBING: Brand: CARDINAL HEALTH

## (undated) DEVICE — GAUZE,SPONGE,4"X4",16PLY,STRL,LF,10/TRAY: Brand: MEDLINE

## (undated) DEVICE — YANKAUER,SMOOTH HANDLE,HIGH CAPACITY: Brand: MEDLINE INDUSTRIES, INC.

## (undated) DEVICE — FORCEPS BX L240CM JAW DIA2.8MM L CAP W/ NDL MIC MESH TOOTH

## (undated) DEVICE — ENDOSCOPY PUMP TUBING/ CAP SET: Brand: ERBE

## (undated) DEVICE — SYRINGE MED 25GA 3ML L5/8IN SUBQ PLAS W/ DETACH NDL SFTY

## (undated) DEVICE — LINER SUCT CANSTR 3000CC PLAS SFT PRE ASSEMB W/OUT TBNG W/

## (undated) DEVICE — SYRINGE MED 10ML LUERLOCK TIP W/O SFTY DISP

## (undated) DEVICE — SYRINGE 20ML LL S/C 50

## (undated) DEVICE — CANNULA NSL AD TBNG L14FT STD PVC O2 CRV CONN NONFLARED NSL

## (undated) DEVICE — GOWN PLASTIC FILM THMBHKS UNIV BLUE: Brand: CARDINAL HEALTH

## (undated) DEVICE — CATHETER THOR 36FR DIA10.7MM POLYVI CHL TRCR TIP STR SFT

## (undated) DEVICE — SOLUTION IRRIG 1000ML H2O STRL BLT

## (undated) DEVICE — SNARE VASC L240CM LOOP W10MM SHTH DIA2.4MM RND STIFF CLD

## (undated) DEVICE — TRAP SPEC POLYP REM STRNR CLN DSGN MAGNIFYING WIND DISP